# Patient Record
Sex: FEMALE | Race: BLACK OR AFRICAN AMERICAN | Employment: UNEMPLOYED | URBAN - METROPOLITAN AREA
[De-identification: names, ages, dates, MRNs, and addresses within clinical notes are randomized per-mention and may not be internally consistent; named-entity substitution may affect disease eponyms.]

---

## 2019-04-16 ENCOUNTER — HOSPITAL ENCOUNTER (OUTPATIENT)
Age: 19
Discharge: HOME | End: 2019-04-18
Payer: MEDICAID

## 2019-04-16 LAB — KLEIHAUER BETKE: NORMAL

## 2019-04-16 PROCEDURE — 85460 HEMOGLOBIN FETAL: CPT

## 2019-06-19 ENCOUNTER — HOSPITAL ENCOUNTER (OUTPATIENT)
Age: 19
Discharge: HOME | End: 2019-06-19
Payer: MEDICAID

## 2019-06-19 LAB
ABO/RH: NORMAL
ANTIBODY SCREEN: NORMAL
RHIG LOT NUMBER: NORMAL

## 2019-06-19 PROCEDURE — 86850 RBC ANTIBODY SCREEN: CPT

## 2019-06-19 PROCEDURE — 86901 BLOOD TYPING SEROLOGIC RH(D): CPT

## 2019-06-19 PROCEDURE — 6360000002 HC RX W HCPCS: Performed by: LEGAL MEDICINE

## 2019-06-19 PROCEDURE — 36415 COLL VENOUS BLD VENIPUNCTURE: CPT

## 2019-06-19 PROCEDURE — 86900 BLOOD TYPING SEROLOGIC ABO: CPT

## 2019-06-19 PROCEDURE — 96372 THER/PROPH/DIAG INJ SC/IM: CPT

## 2019-06-19 RX ADMIN — HUMAN RHO(D) IMMUNE GLOBULIN 300 MCG: 300 INJECTION, SOLUTION INTRAMUSCULAR at 20:15

## 2019-07-06 ENCOUNTER — HOSPITAL ENCOUNTER (EMERGENCY)
Age: 19
Discharge: HOME | End: 2019-07-06
Attending: EMERGENCY MEDICINE
Payer: MEDICAID

## 2019-07-06 VITALS
OXYGEN SATURATION: 98 % | HEART RATE: 100 BPM | DIASTOLIC BLOOD PRESSURE: 77 MMHG | RESPIRATION RATE: 16 BRPM | TEMPERATURE: 98.7 F | WEIGHT: 175 LBS | BODY MASS INDEX: 27.41 KG/M2 | SYSTOLIC BLOOD PRESSURE: 113 MMHG

## 2019-07-06 DIAGNOSIS — R31.9 URINARY TRACT INFECTION WITH HEMATURIA, SITE UNSPECIFIED: ICD-10-CM

## 2019-07-06 DIAGNOSIS — R31.9 HEMATURIA, UNSPECIFIED TYPE: Primary | ICD-10-CM

## 2019-07-06 DIAGNOSIS — N39.0 URINARY TRACT INFECTION WITH HEMATURIA, SITE UNSPECIFIED: ICD-10-CM

## 2019-07-06 LAB
BACTERIA: ABNORMAL /HPF
BILIRUBIN URINE: NEGATIVE
BLOOD, URINE: ABNORMAL
CLARITY: CLEAR
COLOR: YELLOW
EPITHELIAL CELLS, UA: ABNORMAL /HPF
GLUCOSE URINE: NEGATIVE MG/DL
KETONES, URINE: NEGATIVE MG/DL
LEUKOCYTE ESTERASE, URINE: ABNORMAL
NITRITE, URINE: NEGATIVE
PH UA: 7.5 (ref 5–9)
PROTEIN UA: NEGATIVE MG/DL
RBC UA: ABNORMAL /HPF (ref 0–2)
SPECIFIC GRAVITY UA: 1.01 (ref 1–1.03)
UROBILINOGEN, URINE: 1 E.U./DL
WBC UA: ABNORMAL /HPF (ref 0–5)

## 2019-07-06 PROCEDURE — 99283 EMERGENCY DEPT VISIT LOW MDM: CPT

## 2019-07-06 PROCEDURE — 81001 URINALYSIS AUTO W/SCOPE: CPT

## 2019-07-06 RX ORDER — FERROUS SULFATE 325(65) MG
325 TABLET ORAL
COMMUNITY
End: 2019-10-29

## 2019-07-06 RX ORDER — CEFDINIR 300 MG/1
300 CAPSULE ORAL 2 TIMES DAILY
Qty: 14 CAPSULE | Refills: 0 | Status: SHIPPED | OUTPATIENT
Start: 2019-07-06 | End: 2019-07-13

## 2019-07-06 SDOH — HEALTH STABILITY: MENTAL HEALTH: HOW OFTEN DO YOU HAVE A DRINK CONTAINING ALCOHOL?: NEVER

## 2019-07-06 ASSESSMENT — ENCOUNTER SYMPTOMS
NAUSEA: 0
BACK PAIN: 0
EYE DISCHARGE: 0
WHEEZING: 0
SHORTNESS OF BREATH: 0
EYE PAIN: 0
ABDOMINAL DISTENTION: 0
SORE THROAT: 0
EYE REDNESS: 0
VOMITING: 0
COUGH: 0
DIARRHEA: 0
SINUS PRESSURE: 0

## 2019-07-06 NOTE — ED PROVIDER NOTES
---------------------------------  At this time the patient is without objective evidence of an acute process requiring hospitalization or inpatient management. They have remained hemodynamically stable throughout their entire ED visit and are stable for discharge with outpatient follow-up. The plan has been discussed in detail and they are aware of the specific conditions for emergent return, as well as the importance of follow-up. New Prescriptions    CEFDINIR (OMNICEF) 300 MG CAPSULE    Take 1 capsule by mouth 2 times daily for 7 days       Diagnosis:  1. Hematuria, unspecified type    2. Urinary tract infection with hematuria, site unspecified        Disposition:  Patient's disposition: Discharge to home  Patient's condition is stable.          Chris Diaz DO  Resident  07/06/19 [id number]

## 2019-07-15 ENCOUNTER — HOSPITAL ENCOUNTER (OUTPATIENT)
Age: 19
Discharge: HOME | End: 2019-07-15
Attending: LEGAL MEDICINE | Admitting: LEGAL MEDICINE
Payer: MEDICAID

## 2019-07-15 ENCOUNTER — APPOINTMENT (OUTPATIENT)
Dept: ULTRASOUND IMAGING | Age: 19
End: 2019-07-15
Payer: MEDICAID

## 2019-07-15 VITALS — RESPIRATION RATE: 16 BRPM | DIASTOLIC BLOOD PRESSURE: 68 MMHG | SYSTOLIC BLOOD PRESSURE: 122 MMHG | HEART RATE: 88 BPM

## 2019-07-15 PROBLEM — Z34.93 NORMAL PREGNANCY IN THIRD TRIMESTER: Status: ACTIVE | Noted: 2019-07-15

## 2019-07-15 LAB
AMORPHOUS: NORMAL
AMPHETAMINE SCREEN, URINE: NOT DETECTED
BACTERIA: NORMAL /HPF
BARBITURATE SCREEN URINE: NOT DETECTED
BENZODIAZEPINE SCREEN, URINE: NOT DETECTED
BILIRUBIN URINE: NEGATIVE
BLOOD, URINE: NEGATIVE
CANNABINOID SCREEN URINE: NOT DETECTED
CASTS 2: NORMAL /LPF
CLARITY: CLEAR
COCAINE METABOLITE SCREEN URINE: NOT DETECTED
COLOR: YELLOW
EPITHELIAL CELLS, UA: NORMAL /HPF
FETAL FIBRONECTIN: POSITIVE
GLUCOSE URINE: NEGATIVE MG/DL
KETONES, URINE: NEGATIVE MG/DL
KLEIHAUER BETKE: NORMAL
LEUKOCYTE ESTERASE, URINE: ABNORMAL
METHADONE SCREEN, URINE: NOT DETECTED
NITRITE, URINE: NEGATIVE
OPIATE SCREEN URINE: NOT DETECTED
PH UA: 7.5 (ref 5–9)
PHENCYCLIDINE SCREEN URINE: NOT DETECTED
PROPOXYPHENE SCREEN: NOT DETECTED
PROTEIN UA: 30 MG/DL
RBC UA: NORMAL /HPF (ref 0–2)
SPECIFIC GRAVITY UA: 1.01 (ref 1–1.03)
UROBILINOGEN, URINE: 0.2 E.U./DL
WBC UA: NORMAL /HPF (ref 0–5)

## 2019-07-15 PROCEDURE — 36415 COLL VENOUS BLD VENIPUNCTURE: CPT

## 2019-07-15 PROCEDURE — 85460 HEMOGLOBIN FETAL: CPT

## 2019-07-15 PROCEDURE — 99212 OFFICE O/P EST SF 10 MIN: CPT

## 2019-07-15 PROCEDURE — 82731 ASSAY OF FETAL FIBRONECTIN: CPT

## 2019-07-15 PROCEDURE — 80307 DRUG TEST PRSMV CHEM ANLYZR: CPT

## 2019-07-15 PROCEDURE — 76819 FETAL BIOPHYS PROFIL W/O NST: CPT

## 2019-07-15 PROCEDURE — 81001 URINALYSIS AUTO W/SCOPE: CPT

## 2019-07-15 NOTE — PROGRESS NOTES
Patient denied any intercourse in past 24h when FFN was collected. Now states she had intercourse yesterday afternoon.

## 2019-07-15 NOTE — PROGRESS NOTES
Patient presents to unit stating she fell at home in her kitchen after slipping in water, fell onto her right side. Did have co abd tenderness at the time of the fall. Significant other was present . Denies pain at this time, no vaginal bleeding or leaking of fluid. Perceives fetal movement.

## 2019-07-16 NOTE — PROGRESS NOTES
Unable to get a hold of mother, patient spoke with Dr. Elena Watt and discharge instructions received.

## 2019-08-02 ENCOUNTER — HOSPITAL ENCOUNTER (OUTPATIENT)
Age: 19
Setting detail: OBSERVATION
Discharge: HOME | DRG: 560 | End: 2019-08-03
Attending: LEGAL MEDICINE | Admitting: LEGAL MEDICINE
Payer: MEDICAID

## 2019-08-02 PROBLEM — O26.90 COMPLICATED PREGNANCY, UNSPECIFIED TRIMESTER: Status: ACTIVE | Noted: 2019-08-02

## 2019-08-02 LAB
ABO/RH: NORMAL
ALBUMIN SERPL-MCNC: 3.7 G/DL (ref 3.5–5.2)
ALP BLD-CCNC: 221 U/L (ref 35–104)
ALT SERPL-CCNC: 10 U/L (ref 0–32)
AMPHETAMINE SCREEN, URINE: NOT DETECTED
ANION GAP SERPL CALCULATED.3IONS-SCNC: 13 MMOL/L (ref 7–16)
ANTIBODY IDENTIFICATION: NORMAL
ANTIBODY SCREEN: NORMAL
AST SERPL-CCNC: 27 U/L (ref 0–31)
BACTERIA: ABNORMAL /HPF
BARBITURATE SCREEN URINE: NOT DETECTED
BENZODIAZEPINE SCREEN, URINE: NOT DETECTED
BILIRUB SERPL-MCNC: 0.4 MG/DL (ref 0–1.2)
BILIRUBIN URINE: NEGATIVE
BLOOD, URINE: NEGATIVE
BUN BLDV-MCNC: 6 MG/DL (ref 6–20)
CALCIUM SERPL-MCNC: 9.1 MG/DL (ref 8.6–10.2)
CANNABINOID SCREEN URINE: NOT DETECTED
CHLORIDE BLD-SCNC: 101 MMOL/L (ref 98–107)
CLARITY: CLEAR
CO2: 19 MMOL/L (ref 22–29)
COCAINE METABOLITE SCREEN URINE: NOT DETECTED
COLOR: YELLOW
CREAT SERPL-MCNC: 0.6 MG/DL (ref 0.5–1)
DAT POLYSPECIFIC: NORMAL
EPITHELIAL CELLS, UA: ABNORMAL /HPF
GFR AFRICAN AMERICAN: >60
GFR NON-AFRICAN AMERICAN: >60 ML/MIN/1.73
GLUCOSE BLD-MCNC: 86 MG/DL (ref 74–99)
GLUCOSE URINE: NEGATIVE MG/DL
HCT VFR BLD CALC: 34.5 % (ref 34–48)
HEMOGLOBIN: 10.7 G/DL (ref 11.5–15.5)
KETONES, URINE: ABNORMAL MG/DL
LEUKOCYTE ESTERASE, URINE: ABNORMAL
MCH RBC QN AUTO: 24.2 PG (ref 26–35)
MCHC RBC AUTO-ENTMCNC: 31 % (ref 32–34.5)
MCV RBC AUTO: 77.9 FL (ref 80–99.9)
METHADONE SCREEN, URINE: NOT DETECTED
NITRITE, URINE: NEGATIVE
OPIATE SCREEN URINE: NOT DETECTED
PDW BLD-RTO: 14 FL (ref 11.5–15)
PH UA: 7 (ref 5–9)
PHENCYCLIDINE SCREEN URINE: NOT DETECTED
PLATELET # BLD: 174 E9/L (ref 130–450)
PMV BLD AUTO: 12.3 FL (ref 7–12)
POTASSIUM SERPL-SCNC: 4.9 MMOL/L (ref 3.5–5)
PROPOXYPHENE SCREEN: NOT DETECTED
PROTEIN UA: NEGATIVE MG/DL
RBC # BLD: 4.43 E12/L (ref 3.5–5.5)
RBC UA: ABNORMAL /HPF (ref 0–2)
SODIUM BLD-SCNC: 133 MMOL/L (ref 132–146)
SPECIFIC GRAVITY UA: 1.01 (ref 1–1.03)
TOTAL PROTEIN: 7.5 G/DL (ref 6.4–8.3)
UROBILINOGEN, URINE: 1 E.U./DL
WBC # BLD: 11.4 E9/L (ref 4.5–11.5)
WBC UA: ABNORMAL /HPF (ref 0–5)

## 2019-08-02 PROCEDURE — 81001 URINALYSIS AUTO W/SCOPE: CPT

## 2019-08-02 PROCEDURE — 96375 TX/PRO/DX INJ NEW DRUG ADDON: CPT

## 2019-08-02 PROCEDURE — 86900 BLOOD TYPING SEROLOGIC ABO: CPT

## 2019-08-02 PROCEDURE — 96360 HYDRATION IV INFUSION INIT: CPT

## 2019-08-02 PROCEDURE — 99211 OFF/OP EST MAY X REQ PHY/QHP: CPT

## 2019-08-02 PROCEDURE — 36415 COLL VENOUS BLD VENIPUNCTURE: CPT

## 2019-08-02 PROCEDURE — 6360000002 HC RX W HCPCS

## 2019-08-02 PROCEDURE — 80307 DRUG TEST PRSMV CHEM ANLYZR: CPT

## 2019-08-02 PROCEDURE — 80053 COMPREHEN METABOLIC PANEL: CPT

## 2019-08-02 PROCEDURE — 96365 THER/PROPH/DIAG IV INF INIT: CPT

## 2019-08-02 PROCEDURE — 6370000000 HC RX 637 (ALT 250 FOR IP): Performed by: LEGAL MEDICINE

## 2019-08-02 PROCEDURE — 96361 HYDRATE IV INFUSION ADD-ON: CPT

## 2019-08-02 PROCEDURE — 6360000002 HC RX W HCPCS: Performed by: LEGAL MEDICINE

## 2019-08-02 PROCEDURE — 96372 THER/PROPH/DIAG INJ SC/IM: CPT

## 2019-08-02 PROCEDURE — 86880 COOMBS TEST DIRECT: CPT

## 2019-08-02 PROCEDURE — 86901 BLOOD TYPING SEROLOGIC RH(D): CPT

## 2019-08-02 PROCEDURE — 86870 RBC ANTIBODY IDENTIFICATION: CPT

## 2019-08-02 PROCEDURE — 2580000003 HC RX 258: Performed by: LEGAL MEDICINE

## 2019-08-02 PROCEDURE — 86850 RBC ANTIBODY SCREEN: CPT

## 2019-08-02 PROCEDURE — 85027 COMPLETE CBC AUTOMATED: CPT

## 2019-08-02 PROCEDURE — G0378 HOSPITAL OBSERVATION PER HR: HCPCS

## 2019-08-02 RX ORDER — ONDANSETRON 2 MG/ML
4 INJECTION INTRAMUSCULAR; INTRAVENOUS EVERY 6 HOURS PRN
Status: DISCONTINUED | OUTPATIENT
Start: 2019-08-02 | End: 2019-08-03 | Stop reason: HOSPADM

## 2019-08-02 RX ORDER — SODIUM CHLORIDE, SODIUM LACTATE, POTASSIUM CHLORIDE, CALCIUM CHLORIDE 600; 310; 30; 20 MG/100ML; MG/100ML; MG/100ML; MG/100ML
INJECTION, SOLUTION INTRAVENOUS CONTINUOUS
Status: DISCONTINUED | OUTPATIENT
Start: 2019-08-02 | End: 2019-08-03 | Stop reason: HOSPADM

## 2019-08-02 RX ORDER — HYDROMORPHONE HCL 110MG/55ML
0.5 PATIENT CONTROLLED ANALGESIA SYRINGE INTRAVENOUS ONCE
Status: COMPLETED | OUTPATIENT
Start: 2019-08-02 | End: 2019-08-02

## 2019-08-02 RX ORDER — ONDANSETRON 2 MG/ML
INJECTION INTRAMUSCULAR; INTRAVENOUS
Status: COMPLETED
Start: 2019-08-02 | End: 2019-08-02

## 2019-08-02 RX ORDER — HYDROCODONE BITARTRATE AND ACETAMINOPHEN 5; 325 MG/1; MG/1
1 TABLET ORAL EVERY 6 HOURS PRN
Status: DISCONTINUED | OUTPATIENT
Start: 2019-08-02 | End: 2019-08-03 | Stop reason: HOSPADM

## 2019-08-02 RX ADMIN — HYDROCODONE BITARTRATE AND ACETAMINOPHEN 1 TABLET: 5; 325 TABLET ORAL at 21:56

## 2019-08-02 RX ADMIN — SODIUM CHLORIDE, POTASSIUM CHLORIDE, SODIUM LACTATE AND CALCIUM CHLORIDE: 600; 310; 30; 20 INJECTION, SOLUTION INTRAVENOUS at 13:20

## 2019-08-02 RX ADMIN — ONDANSETRON 4 MG: 2 INJECTION INTRAMUSCULAR; INTRAVENOUS at 13:26

## 2019-08-02 RX ADMIN — SODIUM CHLORIDE, POTASSIUM CHLORIDE, SODIUM LACTATE AND CALCIUM CHLORIDE: 600; 310; 30; 20 INJECTION, SOLUTION INTRAVENOUS at 17:41

## 2019-08-02 RX ADMIN — HYDROMORPHONE HYDROCHLORIDE 0.5 MG: 2 INJECTION, SOLUTION INTRAMUSCULAR; INTRAVENOUS; SUBCUTANEOUS at 14:01

## 2019-08-02 ASSESSMENT — PAIN DESCRIPTION - DESCRIPTORS
DESCRIPTORS: CRAMPING

## 2019-08-02 ASSESSMENT — PAIN SCALES - GENERAL
PAINLEVEL_OUTOF10: 0
PAINLEVEL_OUTOF10: 10
PAINLEVEL_OUTOF10: 10

## 2019-08-03 ENCOUNTER — APPOINTMENT (OUTPATIENT)
Dept: ULTRASOUND IMAGING | Age: 19
DRG: 560 | End: 2019-08-03
Payer: MEDICAID

## 2019-08-03 VITALS
WEIGHT: 180 LBS | TEMPERATURE: 98.7 F | HEIGHT: 66 IN | BODY MASS INDEX: 28.93 KG/M2 | HEART RATE: 88 BPM | DIASTOLIC BLOOD PRESSURE: 66 MMHG | SYSTOLIC BLOOD PRESSURE: 124 MMHG | RESPIRATION RATE: 16 BRPM

## 2019-08-03 PROCEDURE — 76819 FETAL BIOPHYS PROFIL W/O NST: CPT

## 2019-08-03 PROCEDURE — G0378 HOSPITAL OBSERVATION PER HR: HCPCS

## 2019-08-03 NOTE — PROGRESS NOTES
RN assuming care of patient. Irregular mild/moderate contractions continue -patient tolerating well thus far. Reactive FHR tracing observed. VSS. Awaiting BPP in ultrasound. Radha Mora in to evaluate patient-vaginal exam done and cervix  4cm/thick per  and states  discharge pending until  BPP and JED resulted.  request to notify her with results prior to discharge. Call bell in reach.

## 2019-08-03 NOTE — PROGRESS NOTES
Verbal discharge instructions reviewed with patient with copy given-verbalized understanding. Instructed to return for increased pain,signs of labor, leaking fluid,vaginal bleeding or decrease in fetal activity-patient verbalized understanding. To return on 8-7-19 for scheduled office visit with Crow Rico. Discharged to home undelivered in stable condition accompanied by support person from L&D to hospital exit.

## 2019-08-03 NOTE — PROGRESS NOTES
Updated Dr. Cassy Del Valle on patient. BPP with JED to be done this morning. No new orders received.

## 2019-08-03 NOTE — H&P
pregnancy at 36.5 weeks with nausea, vomiting,  abdominal pain. Group B Strep positive. PLAN:  We will go ahead and initiate IV fluids. Antiemetics will be  given. Pain medications will be given. Further management based on  clinical response.         Sofia Hidalgo MD    D: 08/03/2019 7:57:37       T: 08/03/2019 8:27:29     [email or web address]  Job#: [id number]     Doc#: [id number]    CC:

## 2019-08-04 ENCOUNTER — HOSPITAL ENCOUNTER (INPATIENT)
Age: 19
LOS: 3 days | Discharge: HOME | DRG: 560 | End: 2019-08-07
Attending: LEGAL MEDICINE | Admitting: LEGAL MEDICINE
Payer: MEDICAID

## 2019-08-04 LAB
ABO/RH: NORMAL
ALBUMIN SERPL-MCNC: 3.3 G/DL (ref 3.5–5.2)
ALP BLD-CCNC: 209 U/L (ref 35–104)
ALT SERPL-CCNC: 7 U/L (ref 0–32)
AMPHETAMINE SCREEN, URINE: NOT DETECTED
ANION GAP SERPL CALCULATED.3IONS-SCNC: 12 MMOL/L (ref 7–16)
ANTIBODY IDENTIFICATION: NORMAL
ANTIBODY SCREEN: NORMAL
AST SERPL-CCNC: 16 U/L (ref 0–31)
BACTERIA: ABNORMAL /HPF
BARBITURATE SCREEN URINE: NOT DETECTED
BENZODIAZEPINE SCREEN, URINE: NOT DETECTED
BILIRUB SERPL-MCNC: 0.3 MG/DL (ref 0–1.2)
BILIRUBIN URINE: NEGATIVE
BLOOD, URINE: ABNORMAL
BUN BLDV-MCNC: 5 MG/DL (ref 6–20)
CALCIUM SERPL-MCNC: 9.3 MG/DL (ref 8.6–10.2)
CANNABINOID SCREEN URINE: NOT DETECTED
CHLORIDE BLD-SCNC: 104 MMOL/L (ref 98–107)
CLARITY: ABNORMAL
CO2: 20 MMOL/L (ref 22–29)
COCAINE METABOLITE SCREEN URINE: NOT DETECTED
COLOR: YELLOW
CREAT SERPL-MCNC: 0.6 MG/DL (ref 0.5–1)
DAT POLYSPECIFIC: NORMAL
DR. NOTIFY: NORMAL
EPITHELIAL CELLS, UA: ABNORMAL /HPF
GFR AFRICAN AMERICAN: >60
GFR NON-AFRICAN AMERICAN: >60 ML/MIN/1.73
GLUCOSE BLD-MCNC: 76 MG/DL (ref 74–99)
GLUCOSE URINE: NEGATIVE MG/DL
HCT VFR BLD CALC: 32.6 % (ref 34–48)
HEMOGLOBIN: 9.9 G/DL (ref 11.5–15.5)
KETONES, URINE: NEGATIVE MG/DL
LEUKOCYTE ESTERASE, URINE: ABNORMAL
MCH RBC QN AUTO: 24 PG (ref 26–35)
MCHC RBC AUTO-ENTMCNC: 30.4 % (ref 32–34.5)
MCV RBC AUTO: 78.9 FL (ref 80–99.9)
METHADONE SCREEN, URINE: NOT DETECTED
NITRITE, URINE: NEGATIVE
OPIATE SCREEN URINE: NOT DETECTED
PDW BLD-RTO: 14.3 FL (ref 11.5–15)
PH UA: 6.5 (ref 5–9)
PHENCYCLIDINE SCREEN URINE: NOT DETECTED
PLATELET # BLD: 174 E9/L (ref 130–450)
PMV BLD AUTO: 12.2 FL (ref 7–12)
POTASSIUM SERPL-SCNC: 4.2 MMOL/L (ref 3.5–5)
PROPOXYPHENE SCREEN: NOT DETECTED
PROTEIN UA: 30 MG/DL
RBC # BLD: 4.13 E12/L (ref 3.5–5.5)
RBC UA: ABNORMAL /HPF (ref 0–2)
SODIUM BLD-SCNC: 136 MMOL/L (ref 132–146)
SPECIFIC GRAVITY UA: 1.01 (ref 1–1.03)
TOTAL PROTEIN: 7 G/DL (ref 6.4–8.3)
UROBILINOGEN, URINE: 2 E.U./DL
WBC # BLD: 11.9 E9/L (ref 4.5–11.5)
WBC UA: ABNORMAL /HPF (ref 0–5)

## 2019-08-04 PROCEDURE — 86901 BLOOD TYPING SEROLOGIC RH(D): CPT

## 2019-08-04 PROCEDURE — 6360000002 HC RX W HCPCS

## 2019-08-04 PROCEDURE — 2580000003 HC RX 258: Performed by: LEGAL MEDICINE

## 2019-08-04 PROCEDURE — 86850 RBC ANTIBODY SCREEN: CPT

## 2019-08-04 PROCEDURE — 80307 DRUG TEST PRSMV CHEM ANLYZR: CPT

## 2019-08-04 PROCEDURE — 85027 COMPLETE CBC AUTOMATED: CPT

## 2019-08-04 PROCEDURE — 86870 RBC ANTIBODY IDENTIFICATION: CPT

## 2019-08-04 PROCEDURE — 1220000001 HC SEMI PRIVATE L&D R&B

## 2019-08-04 PROCEDURE — 36415 COLL VENOUS BLD VENIPUNCTURE: CPT

## 2019-08-04 PROCEDURE — 80053 COMPREHEN METABOLIC PANEL: CPT

## 2019-08-04 PROCEDURE — 86900 BLOOD TYPING SEROLOGIC ABO: CPT

## 2019-08-04 PROCEDURE — 6360000002 HC RX W HCPCS: Performed by: LEGAL MEDICINE

## 2019-08-04 PROCEDURE — 86880 COOMBS TEST DIRECT: CPT

## 2019-08-04 PROCEDURE — 81001 URINALYSIS AUTO W/SCOPE: CPT

## 2019-08-04 RX ORDER — PENICILLIN G 3000000 [IU]/50ML
3 INJECTION, SOLUTION INTRAVENOUS EVERY 4 HOURS
Status: DISCONTINUED | OUTPATIENT
Start: 2019-08-04 | End: 2019-08-05

## 2019-08-04 RX ORDER — ONDANSETRON 2 MG/ML
4 INJECTION INTRAMUSCULAR; INTRAVENOUS EVERY 6 HOURS PRN
Status: DISCONTINUED | OUTPATIENT
Start: 2019-08-04 | End: 2019-08-05 | Stop reason: SDUPTHER

## 2019-08-04 RX ORDER — LIDOCAINE HYDROCHLORIDE 10 MG/ML
30 INJECTION, SOLUTION EPIDURAL; INFILTRATION; INTRACAUDAL; PERINEURAL PRN
Status: DISCONTINUED | OUTPATIENT
Start: 2019-08-04 | End: 2019-08-05

## 2019-08-04 RX ORDER — SODIUM CHLORIDE, SODIUM LACTATE, POTASSIUM CHLORIDE, CALCIUM CHLORIDE 600; 310; 30; 20 MG/100ML; MG/100ML; MG/100ML; MG/100ML
INJECTION, SOLUTION INTRAVENOUS CONTINUOUS
Status: DISCONTINUED | OUTPATIENT
Start: 2019-08-04 | End: 2019-08-05

## 2019-08-04 RX ORDER — SODIUM CHLORIDE 0.9 % (FLUSH) 0.9 %
10 SYRINGE (ML) INJECTION PRN
Status: DISCONTINUED | OUTPATIENT
Start: 2019-08-04 | End: 2019-08-05

## 2019-08-04 RX ORDER — PENICILLIN G POTASSIUM 5000000 [IU]/1
INJECTION, POWDER, FOR SOLUTION INTRAMUSCULAR; INTRAVENOUS
Status: COMPLETED
Start: 2019-08-04 | End: 2019-08-04

## 2019-08-04 RX ORDER — SODIUM CHLORIDE 0.9 % (FLUSH) 0.9 %
10 SYRINGE (ML) INJECTION EVERY 12 HOURS SCHEDULED
Status: DISCONTINUED | OUTPATIENT
Start: 2019-08-04 | End: 2019-08-05

## 2019-08-04 RX ADMIN — DEXTROSE MONOHYDRATE 5 MILLION UNITS: 5 INJECTION INTRAVENOUS at 16:50

## 2019-08-04 RX ADMIN — SODIUM CHLORIDE, POTASSIUM CHLORIDE, SODIUM LACTATE AND CALCIUM CHLORIDE: 600; 310; 30; 20 INJECTION, SOLUTION INTRAVENOUS at 22:46

## 2019-08-04 RX ADMIN — Medication 1 MILLI-UNITS/MIN: at 22:45

## 2019-08-04 RX ADMIN — PENICILLIN G POTASSIUM 5 MILLION UNITS: 5000000 POWDER, FOR SOLUTION INTRAMUSCULAR; INTRAPLEURAL; INTRATHECAL; INTRAVENOUS at 16:50

## 2019-08-04 RX ADMIN — PENICILLIN G 3 MILLION UNITS: 3000000 INJECTION, SOLUTION INTRAVENOUS at 20:53

## 2019-08-04 RX ADMIN — SODIUM CHLORIDE, POTASSIUM CHLORIDE, SODIUM LACTATE AND CALCIUM CHLORIDE: 600; 310; 30; 20 INJECTION, SOLUTION INTRAVENOUS at 16:45

## 2019-08-04 ASSESSMENT — PAIN - FUNCTIONAL ASSESSMENT: PAIN_FUNCTIONAL_ASSESSMENT: 0-10

## 2019-08-04 NOTE — PROGRESS NOTES
Dr. Raghav Cadena updated on repeat SVE. Aware that patient is now deciding against an epidural. Notified of fetal heart rate and uterine activity. Per Dr. Raghav Cadena, update once again when patient is 8cm.

## 2019-08-05 PROCEDURE — 6360000002 HC RX W HCPCS

## 2019-08-05 PROCEDURE — 1220000001 HC SEMI PRIVATE L&D R&B

## 2019-08-05 PROCEDURE — 2580000003 HC RX 258: Performed by: LEGAL MEDICINE

## 2019-08-05 PROCEDURE — 7200000001 HC VAGINAL DELIVERY

## 2019-08-05 PROCEDURE — 0KQM0ZZ REPAIR PERINEUM MUSCLE, OPEN APPROACH: ICD-10-PCS | Performed by: LEGAL MEDICINE

## 2019-08-05 PROCEDURE — 6370000000 HC RX 637 (ALT 250 FOR IP): Performed by: LEGAL MEDICINE

## 2019-08-05 PROCEDURE — 88307 TISSUE EXAM BY PATHOLOGIST: CPT

## 2019-08-05 PROCEDURE — 6360000002 HC RX W HCPCS: Performed by: LEGAL MEDICINE

## 2019-08-05 RX ORDER — LANOLIN 100 %
OINTMENT (GRAM) TOPICAL PRN
Status: DISCONTINUED | OUTPATIENT
Start: 2019-08-05 | End: 2019-08-07 | Stop reason: HOSPADM

## 2019-08-05 RX ORDER — SODIUM CHLORIDE 0.9 % (FLUSH) 0.9 %
10 SYRINGE (ML) INJECTION PRN
Status: DISCONTINUED | OUTPATIENT
Start: 2019-08-05 | End: 2019-08-07 | Stop reason: HOSPADM

## 2019-08-05 RX ORDER — MORPHINE SULFATE 2 MG/ML
2 INJECTION, SOLUTION INTRAMUSCULAR; INTRAVENOUS EVERY 4 HOURS PRN
Status: DISCONTINUED | OUTPATIENT
Start: 2019-08-05 | End: 2019-08-05

## 2019-08-05 RX ORDER — NALBUPHINE HCL 10 MG/ML
5 AMPUL (ML) INJECTION EVERY 4 HOURS PRN
Status: DISCONTINUED | OUTPATIENT
Start: 2019-08-05 | End: 2019-08-05

## 2019-08-05 RX ORDER — CARBOPROST TROMETHAMINE 250 UG/ML
250 INJECTION, SOLUTION INTRAMUSCULAR
Status: ACTIVE | OUTPATIENT
Start: 2019-08-05 | End: 2019-08-05

## 2019-08-05 RX ORDER — ONDANSETRON 2 MG/ML
4 INJECTION INTRAMUSCULAR; INTRAVENOUS EVERY 6 HOURS PRN
Status: DISCONTINUED | OUTPATIENT
Start: 2019-08-05 | End: 2019-08-05

## 2019-08-05 RX ORDER — MORPHINE SULFATE 2 MG/ML
INJECTION, SOLUTION INTRAMUSCULAR; INTRAVENOUS
Status: COMPLETED
Start: 2019-08-05 | End: 2019-08-05

## 2019-08-05 RX ORDER — NALOXONE HYDROCHLORIDE 0.4 MG/ML
0.4 INJECTION, SOLUTION INTRAMUSCULAR; INTRAVENOUS; SUBCUTANEOUS PRN
Status: DISCONTINUED | OUTPATIENT
Start: 2019-08-05 | End: 2019-08-05

## 2019-08-05 RX ORDER — IBUPROFEN 400 MG/1
400 TABLET ORAL EVERY 6 HOURS PRN
Status: DISCONTINUED | OUTPATIENT
Start: 2019-08-05 | End: 2019-08-07 | Stop reason: HOSPADM

## 2019-08-05 RX ORDER — SODIUM CHLORIDE, SODIUM LACTATE, POTASSIUM CHLORIDE, CALCIUM CHLORIDE 600; 310; 30; 20 MG/100ML; MG/100ML; MG/100ML; MG/100ML
INJECTION, SOLUTION INTRAVENOUS CONTINUOUS
Status: DISCONTINUED | OUTPATIENT
Start: 2019-08-05 | End: 2019-08-07 | Stop reason: HOSPADM

## 2019-08-05 RX ORDER — OXYCODONE HYDROCHLORIDE 5 MG/1
5 TABLET ORAL EVERY 4 HOURS PRN
Status: DISCONTINUED | OUTPATIENT
Start: 2019-08-05 | End: 2019-08-07 | Stop reason: HOSPADM

## 2019-08-05 RX ORDER — DOCUSATE SODIUM 100 MG/1
100 CAPSULE, LIQUID FILLED ORAL 2 TIMES DAILY
Status: DISCONTINUED | OUTPATIENT
Start: 2019-08-05 | End: 2019-08-07 | Stop reason: HOSPADM

## 2019-08-05 RX ORDER — SODIUM CHLORIDE 0.9 % (FLUSH) 0.9 %
10 SYRINGE (ML) INJECTION EVERY 12 HOURS SCHEDULED
Status: DISCONTINUED | OUTPATIENT
Start: 2019-08-05 | End: 2019-08-07 | Stop reason: HOSPADM

## 2019-08-05 RX ORDER — ACETAMINOPHEN 500 MG
1000 TABLET ORAL EVERY 6 HOURS PRN
Status: DISCONTINUED | OUTPATIENT
Start: 2019-08-05 | End: 2019-08-07 | Stop reason: HOSPADM

## 2019-08-05 RX ORDER — METHYLERGONOVINE MALEATE 0.2 MG/ML
200 INJECTION INTRAVENOUS
Status: ACTIVE | OUTPATIENT
Start: 2019-08-05 | End: 2019-08-05

## 2019-08-05 RX ORDER — FERROUS SULFATE 325(65) MG
325 TABLET ORAL
Status: DISCONTINUED | OUTPATIENT
Start: 2019-08-05 | End: 2019-08-07 | Stop reason: HOSPADM

## 2019-08-05 RX ADMIN — Medication: at 03:33

## 2019-08-05 RX ADMIN — IBUPROFEN 400 MG: 400 TABLET ORAL at 14:48

## 2019-08-05 RX ADMIN — MORPHINE SULFATE 2 MG: 2 INJECTION, SOLUTION INTRAMUSCULAR; INTRAVENOUS at 01:55

## 2019-08-05 RX ADMIN — Medication 10 ML: at 08:15

## 2019-08-05 RX ADMIN — WITCH HAZEL 40 EACH: 500 SOLUTION RECTAL; TOPICAL at 03:33

## 2019-08-05 RX ADMIN — BENZOCAINE AND LEVOMENTHOL: 200; 5 SPRAY TOPICAL at 03:33

## 2019-08-05 RX ADMIN — DOCUSATE SODIUM 100 MG: 100 CAPSULE, LIQUID FILLED ORAL at 21:51

## 2019-08-05 RX ADMIN — ACETAMINOPHEN 1000 MG: 500 TABLET, FILM COATED ORAL at 08:14

## 2019-08-05 RX ADMIN — DOCUSATE SODIUM 100 MG: 100 CAPSULE, LIQUID FILLED ORAL at 08:14

## 2019-08-05 RX ADMIN — IBUPROFEN 400 MG: 400 TABLET ORAL at 03:33

## 2019-08-05 RX ADMIN — FERROUS SULFATE TAB 325 MG (65 MG ELEMENTAL FE) 325 MG: 325 (65 FE) TAB at 11:08

## 2019-08-05 RX ADMIN — SODIUM CHLORIDE, POTASSIUM CHLORIDE, SODIUM LACTATE AND CALCIUM CHLORIDE: 600; 310; 30; 20 INJECTION, SOLUTION INTRAVENOUS at 03:00

## 2019-08-05 RX ADMIN — OXYCODONE HYDROCHLORIDE 5 MG: 5 TABLET ORAL at 10:21

## 2019-08-05 RX ADMIN — FERROUS SULFATE TAB 325 MG (65 MG ELEMENTAL FE) 325 MG: 325 (65 FE) TAB at 08:14

## 2019-08-05 RX ADMIN — PENICILLIN G 3 MILLION UNITS: 3000000 INJECTION, SOLUTION INTRAVENOUS at 00:57

## 2019-08-05 RX ADMIN — FERROUS SULFATE TAB 325 MG (65 MG ELEMENTAL FE) 325 MG: 325 (65 FE) TAB at 17:21

## 2019-08-05 ASSESSMENT — PAIN DESCRIPTION - DESCRIPTORS
DESCRIPTORS: CRAMPING

## 2019-08-05 ASSESSMENT — PAIN - FUNCTIONAL ASSESSMENT
PAIN_FUNCTIONAL_ASSESSMENT: 0-10

## 2019-08-05 ASSESSMENT — PAIN SCALES - GENERAL
PAINLEVEL_OUTOF10: 5
PAINLEVEL_OUTOF10: 8
PAINLEVEL_OUTOF10: 8
PAINLEVEL_OUTOF10: 10

## 2019-08-05 NOTE — H&P
[address] [place]                              HISTORY AND PHYSICAL    PATIENT NAME: Bonifacio Banks                    :        2000  MED REC NO:   [id number]                            ROOM:       LD10  ACCOUNT NO:   [de-identified]                           ADMIT DATE: 2019. PROVIDER:     Christy Hughes MD    HISTORY OF PRESENT ILLNESS:   24-year-old black female,  1, para  0, EDC 2019, presented to Labor and Delivery on 2019 with  complaints of spontaneous rupture of membranes shortly prior to  presentation. Antepartum care was marked by short cervix. She had  received steroids on 2019 and 2019. Urine culture had been  positive for Group B Strep earlier in the pregnancy. She denied vaginal  bleeding or change in fetal movements. No change in her bowel or  urinary habits. No fevers or chills. PAST MEDICAL HISTORY:  Negative. PAST SURGICAL HISTORY:  Negative. PAST GYN HISTORY:  History of Chlamydia in the first trimester. No DVT,  PE, or other STDs. SOCIAL HISTORY:  Positive for tobacco one-pack per day less than one  year. No alcohol or drugs. Previous drug screens have manifested  marijuana. FAMILY HISTORY:  Noncontributory. ALLERGIES:  No known drug allergies. MEDICATIONS:  Prenatal vitamins. REVIEW OF SYSTEMS:  Negative for cardiac, respiratory, GI, or   abnormalities. PHYSICAL EXAMINATION:  VITAL SIGNS:  Stable. She is afebrile. ABDOMEN/PELVIC:  Fetal heart tones are present in the 140s with  daez-kf-wcmz variability present. Accelerations noted. Contractions  are occurring every 2 to 3 minutes. Cervix is 8, complete vertex and -1  station. Large amount of stool is present in the rectum. Estimated fetal weight 3200 grams. EXTREMITIES:  No clubbing, cyanosis. 1+ edema. NEURO:  CN II through XII are grossly intact.   She is alert and oriented  x4. ASSESSMENT:  Intrauterine pregnancy at 36.5 weeks in labor. Group B  Strep is positive. PLAN:  Risks of vaginal delivery and operative delivery have been  reviewed with her. Indications for operative delivery have been  reviewed with her. Shoulder dystocia and birth trauma have been  reviewed with her. All her questions have been answered and she wishes  to proceed with attempt at vaginal delivery.         Pranay Cisneros MD    D: 08/05/2019 2:34:51       T: 08/05/2019 3:19:41     [email or web address]  Job#: [id number]     Doc#: [id number]    CC:

## 2019-08-05 NOTE — PROGRESS NOTES
Assumed care of pt for 11-7 shift. First contact with pt. Plan of care for night discussed. Pt verbal;izes understanding of IV pitocin to be off at present time. Positive fetal movement perceived by pt and audible per RN. EFM monitored and assessed every 15 minutes.

## 2019-08-05 NOTE — PROGRESS NOTES
Recovery period started. Pt verbalizes understanding of need to remain in bed without RN assist. Bonding well with baby.

## 2019-08-05 NOTE — PROGRESS NOTES
Pt offered pain medication interventions and declined at this time. Educated pt on pain scaled. Verbalizes understanding rates pain8/10. resp easy. Vss. Relaxing on phone in bed.

## 2019-08-05 NOTE — PROGRESS NOTES
Dr. Nannette Torres bedside with VE completed. Instructed pt to receive epidural. Pt verbalizes consent. Anesthesia paged, IV bolus started. Pt instructed on importance of positioning during epidural placement.

## 2019-08-06 PROBLEM — Z37.9 NORMAL LABOR: Status: ACTIVE | Noted: 2019-08-06

## 2019-08-06 LAB
FETAL SCREEN: NORMAL
HEMOGLOBIN: 10.5 G/DL (ref 11.5–15.5)
RHIG LOT NUMBER: NORMAL

## 2019-08-06 PROCEDURE — 6360000002 HC RX W HCPCS: Performed by: LEGAL MEDICINE

## 2019-08-06 PROCEDURE — 85461 HEMOGLOBIN FETAL: CPT

## 2019-08-06 PROCEDURE — 1220000001 HC SEMI PRIVATE L&D R&B

## 2019-08-06 PROCEDURE — 85018 HEMOGLOBIN: CPT

## 2019-08-06 PROCEDURE — 6370000000 HC RX 637 (ALT 250 FOR IP): Performed by: LEGAL MEDICINE

## 2019-08-06 PROCEDURE — 36415 COLL VENOUS BLD VENIPUNCTURE: CPT

## 2019-08-06 PROCEDURE — 96372 THER/PROPH/DIAG INJ SC/IM: CPT

## 2019-08-06 RX ADMIN — DOCUSATE SODIUM 100 MG: 100 CAPSULE, LIQUID FILLED ORAL at 09:43

## 2019-08-06 RX ADMIN — IBUPROFEN 400 MG: 400 TABLET ORAL at 09:50

## 2019-08-06 RX ADMIN — IBUPROFEN 400 MG: 400 TABLET ORAL at 20:44

## 2019-08-06 RX ADMIN — HUMAN RHO(D) IMMUNE GLOBULIN 300 MCG: 300 INJECTION, SOLUTION INTRAMUSCULAR at 10:11

## 2019-08-06 RX ADMIN — FERROUS SULFATE TAB 325 MG (65 MG ELEMENTAL FE) 325 MG: 325 (65 FE) TAB at 09:44

## 2019-08-06 RX ADMIN — DOCUSATE SODIUM 100 MG: 100 CAPSULE, LIQUID FILLED ORAL at 20:44

## 2019-08-06 ASSESSMENT — PAIN - FUNCTIONAL ASSESSMENT: PAIN_FUNCTIONAL_ASSESSMENT: 0-10

## 2019-08-06 ASSESSMENT — PAIN SCALES - GENERAL
PAINLEVEL_OUTOF10: 10
PAINLEVEL_OUTOF10: 0
PAINLEVEL_OUTOF10: 7

## 2019-08-07 VITALS
BODY MASS INDEX: 28.93 KG/M2 | WEIGHT: 180 LBS | HEIGHT: 66 IN | SYSTOLIC BLOOD PRESSURE: 105 MMHG | DIASTOLIC BLOOD PRESSURE: 59 MMHG | RESPIRATION RATE: 16 BRPM | OXYGEN SATURATION: 99 % | HEART RATE: 59 BPM | TEMPERATURE: 98.5 F

## 2019-08-07 PROCEDURE — 90715 TDAP VACCINE 7 YRS/> IM: CPT | Performed by: LEGAL MEDICINE

## 2019-08-07 PROCEDURE — 6360000002 HC RX W HCPCS: Performed by: LEGAL MEDICINE

## 2019-08-07 PROCEDURE — 90471 IMMUNIZATION ADMIN: CPT | Performed by: LEGAL MEDICINE

## 2019-08-07 RX ADMIN — TETANUS TOXOID, REDUCED DIPHTHERIA TOXOID AND ACELLULAR PERTUSSIS VACCINE, ADSORBED 0.5 ML: 5; 2.5; 8; 8; 2.5 SUSPENSION INTRAMUSCULAR at 09:52

## 2019-10-29 ENCOUNTER — HOSPITAL ENCOUNTER (EMERGENCY)
Age: 19
Discharge: HOME | End: 2019-10-29
Attending: EMERGENCY MEDICINE
Payer: MEDICAID

## 2019-10-29 VITALS
BODY MASS INDEX: 25.39 KG/M2 | RESPIRATION RATE: 16 BRPM | OXYGEN SATURATION: 98 % | DIASTOLIC BLOOD PRESSURE: 66 MMHG | HEART RATE: 60 BPM | WEIGHT: 158 LBS | TEMPERATURE: 98 F | HEIGHT: 66 IN | SYSTOLIC BLOOD PRESSURE: 118 MMHG

## 2019-10-29 DIAGNOSIS — H10.33 ACUTE BACTERIAL CONJUNCTIVITIS OF BOTH EYES: Primary | ICD-10-CM

## 2019-10-29 PROCEDURE — G0381 LEV 2 HOSP TYPE B ED VISIT: HCPCS

## 2019-10-29 RX ORDER — TOBRAMYCIN 3 MG/ML
1 SOLUTION/ DROPS OPHTHALMIC EVERY 6 HOURS
Qty: 1 BOTTLE | Refills: 0 | Status: SHIPPED | OUTPATIENT
Start: 2019-10-29 | End: 2019-11-08

## 2019-10-29 ASSESSMENT — ENCOUNTER SYMPTOMS
SINUS PRESSURE: 0
EYE DISCHARGE: 1
VOMITING: 0
EYE PAIN: 0
SHORTNESS OF BREATH: 0
NAUSEA: 0
EYE ITCHING: 1
DIARRHEA: 0
COUGH: 0
PERI-ORBITAL EDEMA: 1
BACK PAIN: 0
ABDOMINAL DISTENTION: 0
EYE REDNESS: 1
SORE THROAT: 0
WHEEZING: 0

## 2020-10-11 ENCOUNTER — HOSPITAL ENCOUNTER (INPATIENT)
Age: 20
LOS: 1 days | Discharge: HOME | DRG: 560 | End: 2020-10-12
Attending: LEGAL MEDICINE | Admitting: LEGAL MEDICINE
Payer: MEDICAID

## 2020-10-11 LAB
ABO/RH: NORMAL
ALBUMIN SERPL-MCNC: 3.4 G/DL (ref 3.5–5.2)
ALP BLD-CCNC: 172 U/L (ref 35–104)
ALT SERPL-CCNC: <5 U/L (ref 0–32)
AMPHETAMINE SCREEN, URINE: NOT DETECTED
ANION GAP SERPL CALCULATED.3IONS-SCNC: 10 MMOL/L (ref 7–16)
ANTIBODY SCREEN: NORMAL
AST SERPL-CCNC: 14 U/L (ref 0–31)
BARBITURATE SCREEN URINE: NOT DETECTED
BENZODIAZEPINE SCREEN, URINE: NOT DETECTED
BILIRUB SERPL-MCNC: 0.3 MG/DL (ref 0–1.2)
BILIRUBIN URINE: NEGATIVE
BLOOD, URINE: NEGATIVE
BUN BLDV-MCNC: 6 MG/DL (ref 6–20)
CALCIUM SERPL-MCNC: 9.1 MG/DL (ref 8.6–10.2)
CANNABINOID SCREEN URINE: POSITIVE
CHLORIDE BLD-SCNC: 106 MMOL/L (ref 98–107)
CLARITY: CLEAR
CO2: 19 MMOL/L (ref 22–29)
COCAINE METABOLITE SCREEN URINE: NOT DETECTED
COLOR: YELLOW
CREAT SERPL-MCNC: 0.8 MG/DL (ref 0.5–1)
FENTANYL SCREEN, URINE: NOT DETECTED
GFR AFRICAN AMERICAN: >60
GFR NON-AFRICAN AMERICAN: >60 ML/MIN/1.73
GLUCOSE BLD-MCNC: 83 MG/DL (ref 74–99)
GLUCOSE URINE: NEGATIVE MG/DL
HCT VFR BLD CALC: 32.5 % (ref 34–48)
HEMOGLOBIN: 10.2 G/DL (ref 11.5–15.5)
KETONES, URINE: ABNORMAL MG/DL
LEUKOCYTE ESTERASE, URINE: NEGATIVE
Lab: ABNORMAL
MCH RBC QN AUTO: 24.9 PG (ref 26–35)
MCHC RBC AUTO-ENTMCNC: 31.4 % (ref 32–34.5)
MCV RBC AUTO: 79.5 FL (ref 80–99.9)
METHADONE SCREEN, URINE: NOT DETECTED
NITRITE, URINE: NEGATIVE
OPIATE SCREEN URINE: NOT DETECTED
OXYCODONE URINE: NOT DETECTED
PDW BLD-RTO: 15 FL (ref 11.5–15)
PH UA: 7.5 (ref 5–9)
PHENCYCLIDINE SCREEN URINE: NOT DETECTED
PLATELET # BLD: 219 E9/L (ref 130–450)
PMV BLD AUTO: 12 FL (ref 7–12)
POTASSIUM SERPL-SCNC: 3.9 MMOL/L (ref 3.5–5)
PROTEIN UA: NEGATIVE MG/DL
RBC # BLD: 4.09 E12/L (ref 3.5–5.5)
SODIUM BLD-SCNC: 135 MMOL/L (ref 132–146)
SPECIFIC GRAVITY UA: 1.02 (ref 1–1.03)
TOTAL PROTEIN: 7.1 G/DL (ref 6.4–8.3)
UROBILINOGEN, URINE: 2 E.U./DL
WBC # BLD: 13.1 E9/L (ref 4.5–11.5)

## 2020-10-11 PROCEDURE — 87081 CULTURE SCREEN ONLY: CPT

## 2020-10-11 PROCEDURE — 36415 COLL VENOUS BLD VENIPUNCTURE: CPT

## 2020-10-11 PROCEDURE — 86900 BLOOD TYPING SEROLOGIC ABO: CPT

## 2020-10-11 PROCEDURE — 2580000003 HC RX 258: Performed by: LEGAL MEDICINE

## 2020-10-11 PROCEDURE — 6360000002 HC RX W HCPCS: Performed by: LEGAL MEDICINE

## 2020-10-11 PROCEDURE — 87591 N.GONORRHOEAE DNA AMP PROB: CPT

## 2020-10-11 PROCEDURE — 88307 TISSUE EXAM BY PATHOLOGIST: CPT

## 2020-10-11 PROCEDURE — 80307 DRUG TEST PRSMV CHEM ANLYZR: CPT

## 2020-10-11 PROCEDURE — 80053 COMPREHEN METABOLIC PANEL: CPT

## 2020-10-11 PROCEDURE — 86850 RBC ANTIBODY SCREEN: CPT

## 2020-10-11 PROCEDURE — 86901 BLOOD TYPING SEROLOGIC RH(D): CPT

## 2020-10-11 PROCEDURE — 7200000001 HC VAGINAL DELIVERY

## 2020-10-11 PROCEDURE — 85027 COMPLETE CBC AUTOMATED: CPT

## 2020-10-11 PROCEDURE — 0UQMXZZ REPAIR VULVA, EXTERNAL APPROACH: ICD-10-PCS | Performed by: LEGAL MEDICINE

## 2020-10-11 PROCEDURE — 81003 URINALYSIS AUTO W/O SCOPE: CPT

## 2020-10-11 PROCEDURE — 6370000000 HC RX 637 (ALT 250 FOR IP): Performed by: LEGAL MEDICINE

## 2020-10-11 PROCEDURE — 87491 CHLMYD TRACH DNA AMP PROBE: CPT

## 2020-10-11 PROCEDURE — 2500000003 HC RX 250 WO HCPCS

## 2020-10-11 PROCEDURE — 6360000002 HC RX W HCPCS

## 2020-10-11 PROCEDURE — 1220000001 HC SEMI PRIVATE L&D R&B

## 2020-10-11 PROCEDURE — 87147 CULTURE TYPE IMMUNOLOGIC: CPT

## 2020-10-11 PROCEDURE — G0480 DRUG TEST DEF 1-7 CLASSES: HCPCS

## 2020-10-11 RX ORDER — SODIUM CHLORIDE, SODIUM LACTATE, POTASSIUM CHLORIDE, CALCIUM CHLORIDE 600; 310; 30; 20 MG/100ML; MG/100ML; MG/100ML; MG/100ML
INJECTION, SOLUTION INTRAVENOUS CONTINUOUS
Status: DISCONTINUED | OUTPATIENT
Start: 2020-10-11 | End: 2020-10-12 | Stop reason: HOSPADM

## 2020-10-11 RX ORDER — SODIUM CHLORIDE 0.9 % (FLUSH) 0.9 %
10 SYRINGE (ML) INJECTION EVERY 12 HOURS SCHEDULED
Status: DISCONTINUED | OUTPATIENT
Start: 2020-10-11 | End: 2020-10-11

## 2020-10-11 RX ORDER — METHYLERGONOVINE MALEATE 0.2 MG/ML
200 INJECTION INTRAVENOUS
Status: ACTIVE | OUTPATIENT
Start: 2020-10-11 | End: 2020-10-11

## 2020-10-11 RX ORDER — ACETAMINOPHEN 325 MG/1
650 TABLET ORAL EVERY 4 HOURS PRN
Status: DISCONTINUED | OUTPATIENT
Start: 2020-10-11 | End: 2020-10-11

## 2020-10-11 RX ORDER — ONDANSETRON 2 MG/ML
4 INJECTION INTRAMUSCULAR; INTRAVENOUS EVERY 6 HOURS PRN
Status: DISCONTINUED | OUTPATIENT
Start: 2020-10-11 | End: 2020-10-11

## 2020-10-11 RX ORDER — CHLORHEXIDINE GLUCONATE 4 G/100ML
SOLUTION TOPICAL
Status: DISCONTINUED
Start: 2020-10-11 | End: 2020-10-11

## 2020-10-11 RX ORDER — IBUPROFEN 400 MG/1
400 TABLET ORAL EVERY 6 HOURS PRN
Status: DISCONTINUED | OUTPATIENT
Start: 2020-10-11 | End: 2020-10-12 | Stop reason: HOSPADM

## 2020-10-11 RX ORDER — MODIFIED LANOLIN
OINTMENT (GRAM) TOPICAL PRN
Status: DISCONTINUED | OUTPATIENT
Start: 2020-10-11 | End: 2020-10-12 | Stop reason: HOSPADM

## 2020-10-11 RX ORDER — OXYCODONE HYDROCHLORIDE 5 MG/1
5 TABLET ORAL EVERY 4 HOURS PRN
Status: DISCONTINUED | OUTPATIENT
Start: 2020-10-11 | End: 2020-10-12 | Stop reason: HOSPADM

## 2020-10-11 RX ORDER — LIDOCAINE HYDROCHLORIDE 10 MG/ML
INJECTION, SOLUTION EPIDURAL; INFILTRATION; INTRACAUDAL; PERINEURAL
Status: DISCONTINUED
Start: 2020-10-11 | End: 2020-10-11

## 2020-10-11 RX ORDER — CARBOPROST TROMETHAMINE 250 UG/ML
250 INJECTION, SOLUTION INTRAMUSCULAR
Status: ACTIVE | OUTPATIENT
Start: 2020-10-11 | End: 2020-10-11

## 2020-10-11 RX ORDER — PENICILLIN G 3000000 [IU]/50ML
3 INJECTION, SOLUTION INTRAVENOUS EVERY 4 HOURS
Status: DISCONTINUED | OUTPATIENT
Start: 2020-10-11 | End: 2020-10-11

## 2020-10-11 RX ORDER — DOCUSATE SODIUM 100 MG/1
100 CAPSULE, LIQUID FILLED ORAL 2 TIMES DAILY
Status: DISCONTINUED | OUTPATIENT
Start: 2020-10-11 | End: 2020-10-11

## 2020-10-11 RX ORDER — ACETAMINOPHEN 500 MG
1000 TABLET ORAL EVERY 6 HOURS PRN
Status: DISCONTINUED | OUTPATIENT
Start: 2020-10-11 | End: 2020-10-12 | Stop reason: HOSPADM

## 2020-10-11 RX ORDER — FERROUS SULFATE 325(65) MG
325 TABLET ORAL
Status: DISCONTINUED | OUTPATIENT
Start: 2020-10-12 | End: 2020-10-12 | Stop reason: HOSPADM

## 2020-10-11 RX ORDER — SODIUM CHLORIDE 0.9 % (FLUSH) 0.9 %
10 SYRINGE (ML) INJECTION EVERY 12 HOURS SCHEDULED
Status: DISCONTINUED | OUTPATIENT
Start: 2020-10-11 | End: 2020-10-12 | Stop reason: HOSPADM

## 2020-10-11 RX ORDER — SODIUM CHLORIDE 0.9 % (FLUSH) 0.9 %
10 SYRINGE (ML) INJECTION PRN
Status: DISCONTINUED | OUTPATIENT
Start: 2020-10-11 | End: 2020-10-12 | Stop reason: HOSPADM

## 2020-10-11 RX ORDER — OXYTOCIN 10 [USP'U]/ML
INJECTION, SOLUTION INTRAMUSCULAR; INTRAVENOUS
Status: DISCONTINUED
Start: 2020-10-11 | End: 2020-10-11

## 2020-10-11 RX ORDER — SODIUM CHLORIDE 0.9 % (FLUSH) 0.9 %
10 SYRINGE (ML) INJECTION PRN
Status: DISCONTINUED | OUTPATIENT
Start: 2020-10-11 | End: 2020-10-11

## 2020-10-11 RX ORDER — DOCUSATE SODIUM 100 MG/1
100 CAPSULE, LIQUID FILLED ORAL 2 TIMES DAILY
Status: DISCONTINUED | OUTPATIENT
Start: 2020-10-11 | End: 2020-10-12 | Stop reason: HOSPADM

## 2020-10-11 RX ORDER — SODIUM CHLORIDE, SODIUM LACTATE, POTASSIUM CHLORIDE, CALCIUM CHLORIDE 600; 310; 30; 20 MG/100ML; MG/100ML; MG/100ML; MG/100ML
INJECTION, SOLUTION INTRAVENOUS CONTINUOUS
Status: DISCONTINUED | OUTPATIENT
Start: 2020-10-11 | End: 2020-10-11

## 2020-10-11 RX ADMIN — DEXTROSE MONOHYDRATE 5 MILLION UNITS: 5 INJECTION INTRAVENOUS at 17:55

## 2020-10-11 RX ADMIN — LIDOCAINE HYDROCHLORIDE: 10 INJECTION, SOLUTION EPIDURAL; INFILTRATION; INTRACAUDAL; PERINEURAL at 20:10

## 2020-10-11 RX ADMIN — OXYTOCIN 10 UNITS: 10 INJECTION INTRAVENOUS at 22:12

## 2020-10-11 RX ADMIN — SODIUM CHLORIDE, POTASSIUM CHLORIDE, SODIUM LACTATE AND CALCIUM CHLORIDE: 600; 310; 30; 20 INJECTION, SOLUTION INTRAVENOUS at 17:50

## 2020-10-11 RX ADMIN — AZITHROMYCIN 250 MG: 500 INJECTION, POWDER, LYOPHILIZED, FOR SOLUTION INTRAVENOUS at 19:51

## 2020-10-11 RX ADMIN — IBUPROFEN 400 MG: 400 TABLET, FILM COATED ORAL at 21:14

## 2020-10-11 ASSESSMENT — PAIN SCALES - GENERAL
PAINLEVEL_OUTOF10: 5
PAINLEVEL_OUTOF10: 3

## 2020-10-11 NOTE — PROGRESS NOTES
Assuming care of pt at this time. Fetal heart rate and uterine activity being monitored every 15 minutes by RN while pt in labor. Pt does not desire epidural, doing well with positioning and breathing through contrations. Encouragement given. Pt mother supportive at bedside. Stephen Escobar remains on unit, EFM visualized. Call light in reach, will continue monitoring.

## 2020-10-11 NOTE — PROGRESS NOTES
Patient moved to LD 5. Plan of care reviewed. Patient's mother in attendance and supportive. FHT and Uterine activity assessed q15 min per protocol.

## 2020-10-11 NOTE — PROGRESS NOTES
Dr. Morelia Espinoza at bedside for AROM, clear fluid. Reviewed fetal tracing, plan of care discussed.

## 2020-10-11 NOTE — PROGRESS NOTES
Notified Dr. Vladimir Nathan of patient arrival and co's. Reviewed BP, fetal and contraction tracing, SVE. Will admit for labor.

## 2020-10-11 NOTE — PROGRESS NOTES
Patient presents to unit with co contractions since 1600 today. Perceives fetal movement, denies bleeding or leaking of fluid.

## 2020-10-12 VITALS
TEMPERATURE: 97.8 F | RESPIRATION RATE: 18 BRPM | HEART RATE: 66 BPM | HEIGHT: 66 IN | BODY MASS INDEX: 29.41 KG/M2 | WEIGHT: 183 LBS | DIASTOLIC BLOOD PRESSURE: 62 MMHG | SYSTOLIC BLOOD PRESSURE: 118 MMHG

## 2020-10-12 LAB
FETAL SCREEN: NORMAL
HEMOGLOBIN: 9.8 G/DL (ref 11.5–15.5)
RHIG LOT NUMBER: NORMAL

## 2020-10-12 PROCEDURE — 36415 COLL VENOUS BLD VENIPUNCTURE: CPT

## 2020-10-12 PROCEDURE — 85018 HEMOGLOBIN: CPT

## 2020-10-12 PROCEDURE — 6360000002 HC RX W HCPCS: Performed by: LEGAL MEDICINE

## 2020-10-12 PROCEDURE — 96372 THER/PROPH/DIAG INJ SC/IM: CPT

## 2020-10-12 PROCEDURE — 85461 HEMOGLOBIN FETAL: CPT

## 2020-10-12 PROCEDURE — 6370000000 HC RX 637 (ALT 250 FOR IP): Performed by: LEGAL MEDICINE

## 2020-10-12 RX ADMIN — IBUPROFEN 400 MG: 400 TABLET, FILM COATED ORAL at 08:07

## 2020-10-12 RX ADMIN — HUMAN RHO(D) IMMUNE GLOBULIN 300 MCG: 300 INJECTION, SOLUTION INTRAMUSCULAR at 08:09

## 2020-10-12 RX ADMIN — FERROUS SULFATE TAB 325 MG (65 MG ELEMENTAL FE) 325 MG: 325 (65 FE) TAB at 08:07

## 2020-10-12 RX ADMIN — DOCUSATE SODIUM 100 MG: 100 CAPSULE, LIQUID FILLED ORAL at 08:07

## 2020-10-12 ASSESSMENT — PAIN DESCRIPTION - DESCRIPTORS: DESCRIPTORS: ACHING;SORE;DISCOMFORT

## 2020-10-12 ASSESSMENT — PAIN DESCRIPTION - PAIN TYPE: TYPE: ACUTE PAIN

## 2020-10-12 ASSESSMENT — PAIN DESCRIPTION - LOCATION: LOCATION: PERINEUM

## 2020-10-12 ASSESSMENT — PAIN DESCRIPTION - ONSET: ONSET: GRADUAL

## 2020-10-12 ASSESSMENT — PAIN - FUNCTIONAL ASSESSMENT: PAIN_FUNCTIONAL_ASSESSMENT: ACTIVITIES ARE NOT PREVENTED

## 2020-10-12 ASSESSMENT — PAIN DESCRIPTION - PROGRESSION: CLINICAL_PROGRESSION: NOT CHANGED

## 2020-10-12 ASSESSMENT — PAIN DESCRIPTION - FREQUENCY: FREQUENCY: INTERMITTENT

## 2020-10-12 ASSESSMENT — PAIN SCALES - GENERAL
PAINLEVEL_OUTOF10: 2
PAINLEVEL_OUTOF10: 8

## 2020-10-12 NOTE — PROGRESS NOTES
Pt assisted to bathroom to void and clean up at this time. Amanda care instructed. Returned to bed, PO fluids and snacks provided. Infant remains in nursery. No needs voiced, rest encouraged.

## 2020-10-12 NOTE — PLAN OF CARE
Problem: Pain:  Description: Pain management should include both nonpharmacologic and pharmacologic interventions.   Goal: Pain level will decrease  Description: Pain level will decrease  Outcome: Met This Shift  Goal: Control of acute pain  Description: Control of acute pain  Outcome: Met This Shift  Goal: Control of chronic pain  Description: Control of chronic pain  Outcome: Met This Shift     Problem: Anxiety:  Goal: Level of anxiety will decrease  Description: Level of anxiety will decrease  Outcome: Met This Shift     Problem: Breathing Pattern - Ineffective:  Goal: Able to breathe comfortably  Description: Able to breathe comfortably  Outcome: Met This Shift     Problem: Fluid Volume - Imbalance:  Goal: Absence of imbalanced fluid volume signs and symptoms  Description: Absence of imbalanced fluid volume signs and symptoms  Outcome: Met This Shift  Goal: Absence of intrapartum hemorrhage signs and symptoms  Description: Absence of intrapartum hemorrhage signs and symptoms  Outcome: Met This Shift     Problem: Infection - Intrapartum Infection:  Goal: Will show no infection signs and symptoms  Description: Will show no infection signs and symptoms  Outcome: Met This Shift     Problem: Labor Process - Prolonged:  Goal: Labor progression, first stage, within specified pattern  Description: Labor progression, first stage, within specified pattern  Outcome: Met This Shift  Goal: Labor progession, second stage, within specified pattern  Description: Labor progession, second stage, within specified pattern  Outcome: Met This Shift  Goal: Uterine contractions within specified parameters  Description: Uterine contractions within specified parameters  Outcome: Met This Shift     Problem:  Screening:  Goal: Ability to make informed decisions regarding treatment has improved  Description: Ability to make informed decisions regarding treatment has improved  Outcome: Met This Shift     Problem: Pain - Acute:  Goal: Pain level will decrease  Description: Pain level will decrease  Outcome: Met This Shift  Goal: Able to cope with pain  Description: Able to cope with pain  Outcome: Met This Shift     Problem: Tissue Perfusion - Uteroplacental, Altered:  Description: [TRUNCATED] For intrapartum patients with recurrent variable decelerations of the fetal heart rate, consider transcervical amnioinfusion. For patients in labor, avoid prophylactic use of continuous maternal oxygen supplementation to prevent nonreassu . ..   Goal: Absence of abnormal fetal heart rate pattern  Description: Absence of abnormal fetal heart rate pattern  Outcome: Met This Shift     Problem: Urinary Retention:  Goal: Experiences of bladder distention will decrease  Description: Experiences of bladder distention will decrease  Outcome: Met This Shift  Goal: Urinary elimination within specified parameters  Description: Urinary elimination within specified parameters  Outcome: Met This Shift

## 2020-10-12 NOTE — PROGRESS NOTES
at bedside, pt becoming increasingly more uncomfortable and losing control, yelling she \"needs her out of me\". Pt prepared for delivery, pt mother supportive at bedside. RN remains at bedside and pt instructed to push.

## 2020-10-12 NOTE — OP NOTE
[address] [place]                                OPERATIVE REPORT    PATIENT NAMEViola Arreaga                    :        2000  MED REC NO:   [id number]                            ROOM:       05  ACCOUNT NO:   [de-identified]                           ADMIT DATE: 10/11/2020. PROVIDER:     Martin Oropeza MD    DATE OF PROCEDURE:  10/11/2020    The patient felt the urge to push. She was placed in Arnie  position. She was able to deliver the fetal head. Fetal trunk was  delivered with minimal traction applied in an axis parallel to the fetal  spine after the shoulder had cleared the pubic bone. Nares and  hypopharynx were suctioned. Cord was clamped and cut. Infant was  crying and vigorous. Cord gases and blood samples were obtained. Placenta was removed spontaneously. Note was made of an intact two  artery, one vein cord placenta, which was sent to pathology. No  cervical or vaginal lacerations were noted. Repair of right labial  laceration was performed in a running fashion. Fundus was firm. Estimated blood loss was 300 mL. For exam of the infant, please refer  to pediatrician's notes. The cord arterial gas pH is 7.347, base excess  -3.1. Cord venous gas pH 7.381, base excess -3.4. Mother and infant  went to recovery room in stable condition.         Corine Figueroa MD    D: 10/11/2020 20:33:26       T: 10/11/2020 20:35:33     [email or web address]  Job#: [id number]     Doc#: [id number]    CC:

## 2020-10-12 NOTE — PLAN OF CARE
will decrease  10/12/2020 0908 by Mariya Moe RN  Outcome: Completed  10/12/2020 0216 by Eliot Najera RN  Outcome: Met This Shift  Goal: Urinary elimination within specified parameters  Description: Urinary elimination within specified parameters  10/12/2020 0908 by Mariya Moe RN  Outcome: Completed  10/12/2020 0216 by Eliot Najera RN  Outcome: Met This Shift

## 2020-10-12 NOTE — H&P
[address]                              HISTORY AND PHYSICAL    PATIENT NAME: Neli Stevens                    :        2000  MED REC NO:   [id number]                            ROOM:       LD05  ACCOUNT NO:   [de-identified]                           ADMIT DATE: 10/11/2020. PROVIDER:     Bethanie Mac MD    HISTORY OF PRESENT ILLNESS:   78-year-old black female, para 0-1-0-1,  [facility] 2020, presents on 10/11/2020 with complaints of regular  uterine contractions since 1600. No leaking fluid or vaginal bleeding. No change in fetal movements. No viral prodrome. No vulvar lesions. Had intercourse yesterday. Has been noncompliant with antepartum  visits. Has a history of a short cervix with her previous pregnancy  noted at 32 weeks and she delivered that infant at 36-1/2 weeks. PAST MEDICAL HISTORY:  Significant for being an SMA carrier. She also  had Chlamydia earlier in the pregnancy. PAST SURGICAL HISTORY:  Negative. PAST GYNECOLOGIC HISTORY:  History of Chlamydia. No DVT, PE, or other  STDs. SOCIAL HISTORY:  Positive for tobacco one-pack per day for 4 years. Denies alcohol or drugs. Previous drug screens have manifested  marijuana. FAMILY HISTORY:  Noncontributory. Her partner was not tested for SMA. REVIEW OF SYSTEMS:  Negative for cardiac, respiratory, GI, or   abnormalities. PHYSICAL EXAMINATION:  VITAL SIGNS:  Stable. Blood pressure 112/53, pulse 70, respiratory rate  16, and temperature 97.1. ABDOMEN:  Fetal heart tones are present in the 130s with hwnb-hz-tmah  variability present. Accelerations noted. Cervix is 7, complete vertex  and -1 station. Contractions are every 2 minutes. Estimated fetal  weight is 3200 gm. EXTREMITIES:  No clubbing or cyanosis. 1+ edema. NEUROLOGIC:  CN II-XII grossly intact. She is alert and oriented x4.     LABORATORY DATA: White count 13.1, hemoglobin 10.2, and platelets  396,290. Urine drug screen shows marijuana. ALT less than 5, AST 14. Creatinine 0.8. Glucose 83. ASSESSMENT:  Intrauterine pregnancy at 36-1/2 weeks in labor. Recent  intercourse. History of Chlamydia. Noncompliant with antepartum  visits. PLAN:  Risks of vaginal delivery and operative delivery have been  reviewed with her. Indications for operative delivery have been  reviewed with her. Shoulder dystocia and birth trauma have been  reviewed with her. All her questions have been answered and she wishes  to proceed with attempt at vaginal delivery. GBS chemoprophylaxis has  been initiated. Gonorrhea and Chlamydia chemoprophylaxis have been  initiated.         Magy Stubbs MD    D: 10/11/2020 18:58:57       T: 10/11/2020 19:01:31     [email or web address]  Job#: [id number]     Doc#: [id number]    CC:

## 2020-10-12 NOTE — PROGRESS NOTES
Discharge instructions reviewed with patient. Verbalized understanding. No questions voiced when asked. Teach back medications successful.

## 2020-10-15 LAB
C. TRACHOMATIS DNA ,URINE: ABNORMAL
CANNABINOIDS CONF, URINE: 100 NG/ML
GROUP B STREP CULTURE: NORMAL
N. GONORRHOEAE DNA, URINE: NEGATIVE
SOURCE: ABNORMAL

## 2021-11-24 ENCOUNTER — HOSPITAL ENCOUNTER (OUTPATIENT)
Age: 21
Discharge: ANOTHER HOSPITAL | End: 2021-11-24
Attending: LEGAL MEDICINE | Admitting: OBSTETRICS & GYNECOLOGY
Payer: MEDICAID

## 2021-11-24 VITALS
HEIGHT: 66 IN | SYSTOLIC BLOOD PRESSURE: 117 MMHG | DIASTOLIC BLOOD PRESSURE: 61 MMHG | RESPIRATION RATE: 17 BRPM | HEART RATE: 82 BPM | BODY MASS INDEX: 23.3 KG/M2 | WEIGHT: 145 LBS | OXYGEN SATURATION: 98 % | TEMPERATURE: 98.8 F

## 2021-11-24 PROBLEM — O26.92 COMPLICATED PREGNANCY, SECOND TRIMESTER: Status: ACTIVE | Noted: 2021-11-24

## 2021-11-24 LAB
ALBUMIN SERPL-MCNC: 3.4 G/DL (ref 3.5–5.2)
ALP BLD-CCNC: 84 U/L (ref 35–104)
ALT SERPL-CCNC: 5 U/L (ref 0–32)
AMNISURE, POC: POSITIVE
AMPHETAMINE SCREEN, URINE: NOT DETECTED
ANION GAP SERPL CALCULATED.3IONS-SCNC: 11 MMOL/L (ref 7–16)
AST SERPL-CCNC: 9 U/L (ref 0–31)
BACTERIA: ABNORMAL /HPF
BARBITURATE SCREEN URINE: NOT DETECTED
BASOPHILS ABSOLUTE: 0.03 E9/L (ref 0–0.2)
BASOPHILS RELATIVE PERCENT: 0.2 % (ref 0–2)
BENZODIAZEPINE SCREEN, URINE: NOT DETECTED
BILIRUB SERPL-MCNC: <0.2 MG/DL (ref 0–1.2)
BILIRUBIN DIRECT: <0.2 MG/DL (ref 0–0.3)
BILIRUBIN URINE: NEGATIVE
BILIRUBIN, INDIRECT: ABNORMAL MG/DL (ref 0–1)
BLOOD, URINE: NEGATIVE
BUN BLDV-MCNC: 6 MG/DL (ref 6–20)
CALCIUM SERPL-MCNC: 8.6 MG/DL (ref 8.6–10.2)
CANNABINOID SCREEN URINE: NOT DETECTED
CHLORIDE BLD-SCNC: 107 MMOL/L (ref 98–107)
CLARITY: ABNORMAL
CO2: 19 MMOL/L (ref 22–29)
COCAINE METABOLITE SCREEN URINE: NOT DETECTED
COLOR: YELLOW
CREAT SERPL-MCNC: 0.6 MG/DL (ref 0.5–1)
EOSINOPHILS ABSOLUTE: 0.2 E9/L (ref 0.05–0.5)
EOSINOPHILS RELATIVE PERCENT: 1.3 % (ref 0–6)
EPITHELIAL CELLS, UA: ABNORMAL /HPF
FENTANYL SCREEN, URINE: NOT DETECTED
FETAL FIBRONECTIN: POSITIVE
GFR AFRICAN AMERICAN: >60
GFR NON-AFRICAN AMERICAN: >60 ML/MIN/1.73
GLUCOSE BLD-MCNC: 94 MG/DL (ref 74–99)
GLUCOSE URINE: NEGATIVE MG/DL
HCT VFR BLD CALC: 27.8 % (ref 34–48)
HEMOGLOBIN: 9.2 G/DL (ref 11.5–15.5)
IMMATURE GRANULOCYTES #: 0.37 E9/L
IMMATURE GRANULOCYTES %: 2.3 % (ref 0–5)
KETONES, URINE: ABNORMAL MG/DL
LEUKOCYTE ESTERASE, URINE: ABNORMAL
LYMPHOCYTES ABSOLUTE: 2.68 E9/L (ref 1.5–4)
LYMPHOCYTES RELATIVE PERCENT: 17 % (ref 20–42)
Lab: ABNORMAL
Lab: NORMAL
MCH RBC QN AUTO: 26.4 PG (ref 26–35)
MCHC RBC AUTO-ENTMCNC: 33.1 % (ref 32–34.5)
MCV RBC AUTO: 79.7 FL (ref 80–99.9)
METHADONE SCREEN, URINE: NOT DETECTED
MONOCYTES ABSOLUTE: 1.13 E9/L (ref 0.1–0.95)
MONOCYTES RELATIVE PERCENT: 7.2 % (ref 2–12)
NEGATIVE QC PASS/FAIL: ABNORMAL
NEUTROPHILS ABSOLUTE: 11.37 E9/L (ref 1.8–7.3)
NEUTROPHILS RELATIVE PERCENT: 72 % (ref 43–80)
NITRITE, URINE: NEGATIVE
OPIATE SCREEN URINE: NOT DETECTED
OXYCODONE URINE: NOT DETECTED
PDW BLD-RTO: 15.2 FL (ref 11.5–15)
PH UA: 6.5 (ref 5–9)
PHENCYCLIDINE SCREEN URINE: NOT DETECTED
PLATELET # BLD: 241 E9/L (ref 130–450)
PMV BLD AUTO: 11.1 FL (ref 7–12)
POSITIVE QC PASS/FAIL: ABNORMAL
POTASSIUM REFLEX MAGNESIUM: 3.9 MMOL/L (ref 3.5–5)
PROTEIN UA: ABNORMAL MG/DL
RAPID HIV 1&2: NORMAL
RBC # BLD: 3.49 E12/L (ref 3.5–5.5)
RBC UA: ABNORMAL /HPF (ref 0–2)
SODIUM BLD-SCNC: 137 MMOL/L (ref 132–146)
SPECIFIC GRAVITY UA: 1.02 (ref 1–1.03)
TOTAL PROTEIN: 6.3 G/DL (ref 6.4–8.3)
UROBILINOGEN, URINE: 2 E.U./DL
WBC # BLD: 15.8 E9/L (ref 4.5–11.5)
WBC UA: ABNORMAL /HPF (ref 0–5)

## 2021-11-24 PROCEDURE — 86901 BLOOD TYPING SEROLOGIC RH(D): CPT

## 2021-11-24 PROCEDURE — 99213 OFFICE O/P EST LOW 20 MIN: CPT

## 2021-11-24 PROCEDURE — 96365 THER/PROPH/DIAG IV INF INIT: CPT

## 2021-11-24 PROCEDURE — 6360000002 HC RX W HCPCS: Performed by: OBSTETRICS & GYNECOLOGY

## 2021-11-24 PROCEDURE — 80307 DRUG TEST PRSMV CHEM ANLYZR: CPT

## 2021-11-24 PROCEDURE — 85025 COMPLETE CBC W/AUTO DIFF WBC: CPT

## 2021-11-24 PROCEDURE — 80048 BASIC METABOLIC PNL TOTAL CA: CPT

## 2021-11-24 PROCEDURE — 86762 RUBELLA ANTIBODY: CPT

## 2021-11-24 PROCEDURE — 87147 CULTURE TYPE IMMUNOLOGIC: CPT

## 2021-11-24 PROCEDURE — 2580000003 HC RX 258: Performed by: OBSTETRICS & GYNECOLOGY

## 2021-11-24 PROCEDURE — 87081 CULTURE SCREEN ONLY: CPT

## 2021-11-24 PROCEDURE — 86900 BLOOD TYPING SEROLOGIC ABO: CPT

## 2021-11-24 PROCEDURE — 86701 HIV-1ANTIBODY: CPT

## 2021-11-24 PROCEDURE — 87340 HEPATITIS B SURFACE AG IA: CPT

## 2021-11-24 PROCEDURE — 96376 TX/PRO/DX INJ SAME DRUG ADON: CPT

## 2021-11-24 PROCEDURE — 96375 TX/PRO/DX INJ NEW DRUG ADDON: CPT

## 2021-11-24 PROCEDURE — 87491 CHLMYD TRACH DNA AMP PROBE: CPT

## 2021-11-24 PROCEDURE — 96368 THER/DIAG CONCURRENT INF: CPT

## 2021-11-24 PROCEDURE — 87591 N.GONORRHOEAE DNA AMP PROB: CPT

## 2021-11-24 PROCEDURE — 82731 ASSAY OF FETAL FIBRONECTIN: CPT

## 2021-11-24 PROCEDURE — 36415 COLL VENOUS BLD VENIPUNCTURE: CPT

## 2021-11-24 PROCEDURE — 96372 THER/PROPH/DIAG INJ SC/IM: CPT

## 2021-11-24 PROCEDURE — 86850 RBC ANTIBODY SCREEN: CPT

## 2021-11-24 PROCEDURE — 80076 HEPATIC FUNCTION PANEL: CPT

## 2021-11-24 PROCEDURE — 81001 URINALYSIS AUTO W/SCOPE: CPT

## 2021-11-24 RX ORDER — CALCIUM GLUCONATE 94 MG/ML
1000 INJECTION, SOLUTION INTRAVENOUS PRN
Status: DISCONTINUED | OUTPATIENT
Start: 2021-11-24 | End: 2021-11-25 | Stop reason: HOSPADM

## 2021-11-24 RX ORDER — SODIUM CHLORIDE 0.9 % (FLUSH) 0.9 %
5-40 SYRINGE (ML) INJECTION PRN
Status: DISCONTINUED | OUTPATIENT
Start: 2021-11-24 | End: 2021-11-25 | Stop reason: HOSPADM

## 2021-11-24 RX ORDER — SODIUM CHLORIDE, SODIUM LACTATE, POTASSIUM CHLORIDE, CALCIUM CHLORIDE 600; 310; 30; 20 MG/100ML; MG/100ML; MG/100ML; MG/100ML
INJECTION, SOLUTION INTRAVENOUS CONTINUOUS
Status: DISCONTINUED | OUTPATIENT
Start: 2021-11-24 | End: 2021-11-25 | Stop reason: HOSPADM

## 2021-11-24 RX ORDER — BETAMETHASONE SODIUM PHOSPHATE AND BETAMETHASONE ACETATE 3; 3 MG/ML; MG/ML
12 INJECTION, SUSPENSION INTRA-ARTICULAR; INTRALESIONAL; INTRAMUSCULAR; SOFT TISSUE ONCE
Status: COMPLETED | OUTPATIENT
Start: 2021-11-24 | End: 2021-11-24

## 2021-11-24 RX ORDER — SODIUM CHLORIDE 9 MG/ML
25 INJECTION, SOLUTION INTRAVENOUS PRN
Status: DISCONTINUED | OUTPATIENT
Start: 2021-11-24 | End: 2021-11-25 | Stop reason: HOSPADM

## 2021-11-24 RX ORDER — MAGNESIUM SULFATE IN WATER 40 MG/ML
4000 INJECTION, SOLUTION INTRAVENOUS ONCE
Status: COMPLETED | OUTPATIENT
Start: 2021-11-24 | End: 2021-11-24

## 2021-11-24 RX ORDER — SODIUM CHLORIDE 0.9 % (FLUSH) 0.9 %
5-40 SYRINGE (ML) INJECTION EVERY 12 HOURS SCHEDULED
Status: DISCONTINUED | OUTPATIENT
Start: 2021-11-24 | End: 2021-11-25 | Stop reason: HOSPADM

## 2021-11-24 RX ADMIN — MAGNESIUM SULFATE HEPTAHYDRATE 4000 MG: 40 INJECTION, SOLUTION INTRAVENOUS at 21:45

## 2021-11-24 RX ADMIN — BETAMETHASONE SODIUM PHOSPHATE AND BETAMETHASONE ACETATE 12 MG: 3; 3 INJECTION, SUSPENSION INTRA-ARTICULAR; INTRALESIONAL; INTRAMUSCULAR at 21:59

## 2021-11-24 RX ADMIN — SODIUM CHLORIDE, POTASSIUM CHLORIDE, SODIUM LACTATE AND CALCIUM CHLORIDE: 600; 310; 30; 20 INJECTION, SOLUTION INTRAVENOUS at 21:40

## 2021-11-24 RX ADMIN — AZITHROMYCIN MONOHYDRATE 500 MG: 500 INJECTION, POWDER, LYOPHILIZED, FOR SOLUTION INTRAVENOUS at 22:43

## 2021-11-24 RX ADMIN — MAGNESIUM SULFATE HEPTAHYDRATE 2000 MG/HR: 40 INJECTION, SOLUTION INTRAVENOUS at 22:04

## 2021-11-25 ENCOUNTER — HOSPITAL ENCOUNTER (INPATIENT)
Age: 21
LOS: 7 days | Discharge: LEFT BEFORE DISCHARGE | DRG: 566 | End: 2021-12-02
Attending: OBSTETRICS & GYNECOLOGY | Admitting: OBSTETRICS & GYNECOLOGY
Payer: MEDICAID

## 2021-11-25 ENCOUNTER — ANCILLARY PROCEDURE (OUTPATIENT)
Dept: OBGYN CLINIC | Age: 21
DRG: 566 | End: 2021-11-25
Payer: MEDICAID

## 2021-11-25 PROBLEM — O09.33 NO PRENATAL CARE IN CURRENT PREGNANCY IN THIRD TRIMESTER: Status: ACTIVE | Noted: 2021-11-25

## 2021-11-25 PROBLEM — O42.90 PREMATURE RUPTURE OF MEMBRANES: Status: ACTIVE | Noted: 2021-11-25

## 2021-11-25 PROBLEM — O09.899 H/O PRETERM DELIVERY, CURRENTLY PREGNANT: Status: ACTIVE | Noted: 2021-11-25

## 2021-11-25 PROBLEM — Z64.1 MULTIPARITY: Status: ACTIVE | Noted: 2021-11-25

## 2021-11-25 LAB
ABO/RH: NORMAL
ABO/RH: NORMAL
ANTIBODY SCREEN: NORMAL
ANTIBODY SCREEN: NORMAL
RHIG LOT NUMBER: NORMAL
SARS-COV-2, NAAT: NOT DETECTED

## 2021-11-25 PROCEDURE — 86900 BLOOD TYPING SEROLOGIC ABO: CPT

## 2021-11-25 PROCEDURE — 1220000000 HC SEMI PRIVATE OB R&B

## 2021-11-25 PROCEDURE — 87635 SARS-COV-2 COVID-19 AMP PRB: CPT

## 2021-11-25 PROCEDURE — 99253 IP/OBS CNSLTJ NEW/EST LOW 45: CPT | Performed by: OBSTETRICS & GYNECOLOGY

## 2021-11-25 PROCEDURE — 86901 BLOOD TYPING SEROLOGIC RH(D): CPT

## 2021-11-25 PROCEDURE — 36415 COLL VENOUS BLD VENIPUNCTURE: CPT

## 2021-11-25 PROCEDURE — 6370000000 HC RX 637 (ALT 250 FOR IP): Performed by: OBSTETRICS & GYNECOLOGY

## 2021-11-25 PROCEDURE — 76819 FETAL BIOPHYS PROFIL W/O NST: CPT | Performed by: OBSTETRICS & GYNECOLOGY

## 2021-11-25 PROCEDURE — 76805 OB US >/= 14 WKS SNGL FETUS: CPT | Performed by: OBSTETRICS & GYNECOLOGY

## 2021-11-25 PROCEDURE — 2580000003 HC RX 258: Performed by: OBSTETRICS & GYNECOLOGY

## 2021-11-25 PROCEDURE — 76820 UMBILICAL ARTERY ECHO: CPT | Performed by: OBSTETRICS & GYNECOLOGY

## 2021-11-25 PROCEDURE — 6360000002 HC RX W HCPCS: Performed by: OBSTETRICS & GYNECOLOGY

## 2021-11-25 PROCEDURE — 86850 RBC ANTIBODY SCREEN: CPT

## 2021-11-25 RX ORDER — BETAMETHASONE SODIUM PHOSPHATE AND BETAMETHASONE ACETATE 3; 3 MG/ML; MG/ML
12 INJECTION, SUSPENSION INTRA-ARTICULAR; INTRALESIONAL; INTRAMUSCULAR; SOFT TISSUE ONCE
Status: COMPLETED | OUTPATIENT
Start: 2021-11-25 | End: 2021-11-25

## 2021-11-25 RX ORDER — ACETAMINOPHEN 325 MG/1
650 TABLET ORAL EVERY 4 HOURS PRN
Status: DISCONTINUED | OUTPATIENT
Start: 2021-11-25 | End: 2021-12-02 | Stop reason: HOSPADM

## 2021-11-25 RX ORDER — ONDANSETRON 2 MG/ML
8 INJECTION INTRAMUSCULAR; INTRAVENOUS EVERY 8 HOURS PRN
Status: DISCONTINUED | OUTPATIENT
Start: 2021-11-25 | End: 2021-12-02 | Stop reason: HOSPADM

## 2021-11-25 RX ORDER — AMOXICILLIN 250 MG/1
250 CAPSULE ORAL EVERY 8 HOURS SCHEDULED
Status: DISCONTINUED | OUTPATIENT
Start: 2021-11-27 | End: 2021-12-02 | Stop reason: HOSPADM

## 2021-11-25 RX ORDER — AZITHROMYCIN 250 MG/1
250 TABLET, FILM COATED ORAL DAILY
Status: COMPLETED | OUTPATIENT
Start: 2021-11-25 | End: 2021-11-30

## 2021-11-25 RX ORDER — FAMOTIDINE 20 MG/1
20 TABLET, FILM COATED ORAL 2 TIMES DAILY
Status: DISCONTINUED | OUTPATIENT
Start: 2021-11-25 | End: 2021-12-02 | Stop reason: HOSPADM

## 2021-11-25 RX ORDER — SODIUM CHLORIDE, SODIUM LACTATE, POTASSIUM CHLORIDE, CALCIUM CHLORIDE 600; 310; 30; 20 MG/100ML; MG/100ML; MG/100ML; MG/100ML
INJECTION, SOLUTION INTRAVENOUS CONTINUOUS
Status: DISCONTINUED | OUTPATIENT
Start: 2021-11-25 | End: 2021-12-02 | Stop reason: HOSPADM

## 2021-11-25 RX ORDER — FERROUS SULFATE 325(65) MG
325 TABLET ORAL 2 TIMES DAILY WITH MEALS
Status: DISCONTINUED | OUTPATIENT
Start: 2021-11-25 | End: 2021-11-28

## 2021-11-25 RX ADMIN — FERROUS SULFATE TAB 325 MG (65 MG ELEMENTAL FE) 325 MG: 325 (65 FE) TAB at 20:17

## 2021-11-25 RX ADMIN — BETAMETHASONE SODIUM PHOSPHATE AND BETAMETHASONE ACETATE 12 MG: 3; 3 INJECTION, SUSPENSION INTRA-ARTICULAR; INTRALESIONAL; INTRAMUSCULAR at 22:03

## 2021-11-25 RX ADMIN — ENOXAPARIN SODIUM 40 MG: 100 INJECTION SUBCUTANEOUS at 09:30

## 2021-11-25 RX ADMIN — AMPICILLIN SODIUM 1000 MG: 1 INJECTION, POWDER, FOR SOLUTION INTRAMUSCULAR; INTRAVENOUS at 05:38

## 2021-11-25 RX ADMIN — FAMOTIDINE 20 MG: 20 TABLET ORAL at 09:34

## 2021-11-25 RX ADMIN — SODIUM CHLORIDE, POTASSIUM CHLORIDE, SODIUM LACTATE AND CALCIUM CHLORIDE: 600; 310; 30; 20 INJECTION, SOLUTION INTRAVENOUS at 16:15

## 2021-11-25 RX ADMIN — FAMOTIDINE 20 MG: 20 TABLET ORAL at 20:17

## 2021-11-25 RX ADMIN — HUMAN RHO(D) IMMUNE GLOBULIN 300 MCG: 300 INJECTION, SOLUTION INTRAMUSCULAR at 22:14

## 2021-11-25 RX ADMIN — MAGNESIUM SULFATE HEPTAHYDRATE 2000 MG/HR: 40 INJECTION, SOLUTION INTRAVENOUS at 07:47

## 2021-11-25 RX ADMIN — AZITHROMYCIN 250 MG: 250 TABLET, FILM COATED ORAL at 13:57

## 2021-11-25 RX ADMIN — AMPICILLIN SODIUM 1000 MG: 1 INJECTION, POWDER, FOR SOLUTION INTRAMUSCULAR; INTRAVENOUS at 20:20

## 2021-11-25 RX ADMIN — MAGNESIUM SULFATE HEPTAHYDRATE 2000 MG/HR: 40 INJECTION, SOLUTION INTRAVENOUS at 18:36

## 2021-11-25 NOTE — H&P
polyphagia, polydipsia or polyuria  Hem: no increased bleeding, no bruising, no lymphadenopathy  Skin: no skin changes  Psych: no depressed mood, no suicidal ideation    Social History:     reports that she has quit smoking. Her smoking use included cigarettes. She has never used smokeless tobacco. She reports that she does not drink alcohol and does not use drugs. Medications Prior to Admission:  No medications prior to admission. Allergies:  Patient has no known allergies. PHYSICAL EXAM:  LMP 05/01/2021   General appearance: Comfortable  Lungs:  CTA   Heart:  Regular Rhythm  Abdomen:  Soft, non-tender, gravid  Fetal heart rate:  Baseline Heart Rate 120, accelerations:     Contraction frequency:  none  Membranes:  Ruptured       ASSESSMENT     IUP at 29 4/7 weeks.     pprom         Plan: nurse notified dr Halie Aguilera and orders recieved        Electronically signed by Riri Flores MD on 11/25/2021 at 1:02 AM

## 2021-11-25 NOTE — PROGRESS NOTES
Dr Jennifer Clark updated on pt. FHTs reviewed. Minimal variability with periods of moderate variability. No decelerations.  Antibiotic orders modified per dr Lisa Chaudhary

## 2021-11-25 NOTE — PROGRESS NOTES
Pt off unit via stretcher with EMTs for ambulance transfer to [facility] L&D in stable condition, undelivered at this time.

## 2021-11-25 NOTE — PROGRESS NOTES
at bedside 2144. Bedside US performed and POC for transfer to [address] L&D discussed with pt. Pt verbalized understanding of care plan. RN x2 at bedside, MgSO4 bolus initiated per orders.

## 2021-11-25 NOTE — PROGRESS NOTES
RN to bedside, informed that pt has had no prenatal care for this pregnancy and that perineum does appear wet. Amnisure performed and swabs and cultures obtained. SVE closed and posterior.

## 2021-11-25 NOTE — PROGRESS NOTES
Pt returned to room from [facility]. EFM applied, vital signs obtained. Pt denies headache, changes in vision, epigastric pain, and N/V. Pt states her stomach hurts but doesn't know if it is contractions. Rates pain 8/10. Pt located cramping in upper abdomen, not near fundus/ uterus. toco adjusted. Pt denies vaginal bleeding and lof. Perceives positive fetal movement. Call light within reach.

## 2021-11-25 NOTE — PROGRESS NOTES
Upon entering room, pt very upset. States \"you dont understand. I have 2 kids at home. I have no one to watch my kids. I have to go home. \". RN explained to pt that Dr Kayce Tipton was already notified of her desire to leave, and that she would be admitted until delivery. Pt requesting to speak with a doctor. Dr Aleshia Bynum called and notified of pt stating that she needs to leave and asking about discharge. Pt may not be discharged, and would have to sign out against medical advice to leave. Dr Elder Funez transferred to pt's in room phone to speak with her regarding admission and risks of signing out [facility] [place].

## 2021-11-25 NOTE — PROGRESS NOTES
GP: 2/1     EDC: 2/4/21    Patient arrived ambulatory to the Labor and Delivery unit with complaints of SROM at 3pm.  EFM applied and fetal well being established. Patient assessed and information obtained about health and history. Patient oriented to room and call light.

## 2021-11-25 NOTE — PROGRESS NOTES
called as house OB with new patient arrival and complaints. Notified of SROM, see new orders for MgSO4 admnistration at this time. Will be in to transfer patient shortly.

## 2021-11-25 NOTE — PROGRESS NOTES
RE: Efraín Mcgraw  : 2000   AGE: 19 y.o. This report has been created using voice recognition software. It may contain errors which are inherent in voice recognition technology. Dear Doctor:    Austin Cotton had a consultation today for the following indications:    Patient Active Problem List   Diagnosis    Multiparity    Premature rupture of membranes     premature rupture of membranes (PPROM) with onset of labor after 24 hours of rupture in third trimester, antepartum    No prenatal care in current pregnancy in third trimester    H/O  delivery, currently pregnant     Austin Cotton is a 19 y.o. female, who is G3(0,2,0,2). She has an Estimated Date of Delivery: 22 based on her LMP. She is currently 29 weeks 5 days gestation based on that assessment. The patient has had no prenatal care with her current pregnancy. The patient was transferred to [facility] secondary to  premature rupture of the amniotic membranes on 2021. The patient was considering termination of pregnancy or adoption. She had 2 prior  deliveries. She has had no fever or chills. She had no abdominal trauma. She had no abdominal pain or tenderness. Her fetal movement has been good. The fetal heart rate tracing shows minimal variability with accelerations. Occasional variable decelerations are noted. The tracing is category 2. A fetal ultrasound evaluation was performed and a detailed report included in the EMR under the imaging tab from today's date. A living franco intrauterine fetus was identified in the cephalic presentation, with normal fetal heart motion and normal fetal motion noted. The amniotic fluid index was 2.3 cm consistent with oligohydramnios. Visualization of the fetal anatomy was limited secondary to the decreased amniotic fluid volume.   The biometric measurements were consistent with an estimated fetal weight of 2 pounds 11 ounces which places the fetus at the 29th growth percentile based on her estimated date of delivery established by her last menstrual period. The patient had no prior ultrasound evaluations during this pregnancy. Biometric measurements at this late gestational age are not reliable indicators of gestational age. The biophysical profile was scored 6 of 8, with 2 points deducted for the decreased amniotic fluid volume. The cord Doppler SD ratios were within normal limits for her gestational age. There was no evidence of absence, or reversal of end-diastolic flow. GENETIC SCREENING/TERATOLOGY COUNSELING              (Includes patient, FTB, and any affected family members)    Patient Age > 35 Years NO   Thalassemia ( MVC<80) NO   Congential Heart Defect NO   Neural Tube Defect NO   Armen-Sachs NO   Sickle Cell Disease NO   Sickle Cell Trait NO   Sickle C Disease or Trait NO   Hemophilia NO   Muscular Dystrophy NO   Cystic Fibrosis NO   York Disease NO   Autism NO   Mental Retardation NO   History of Fragile X NO   Maternal Diabetes NO   Other Genetic Disease or Syndrome NO   Previous Child With Congenital Abnormality Not Listed NO   Recreational Drugs NO                                                                 INFECTION HISTORY     HEPATITIS IMMUNIZED:  NO   HEPATITIS INFECTION:  NO   EXPOSURE TO TB NO   PARVOVIRUS B-19 NO   CHICKEN POX  NO   MEASLES NO   HIV NO   OTHER RASH OR VIRAL ILLNESS SINCE LMP NO   UTI RECURRENT NO   HPV NO     OB History    Para Term  AB Living   3 2   2   2   SAB IAB Ectopic Molar Multiple Live Births           0 2      # Outcome Date GA Lbr Michael/2nd Weight Sex Delivery Anes PTL Lv   3 Current            2  10/11/20 36w4d 01:03 / 00:11 5 lb 10.3 oz (2.56 kg) F Vag-Spont None Y SUJATA   1  19 36w5d 10:45 / 00:14 6 lb 6 oz (2.892 kg) M Vag-Spont Local N SUJATA     PAST GYNECOLOGICAL  HISTORY:  Negative for abnormal pap smears. Negative for sexually transmitted diseases. Negative for cervical LEEP / conization /cryosurgery. Negative for uterine surgery. Negative for ovarian or tubal surgery. History reviewed. No pertinent past medical history. History reviewed. No pertinent surgical history. No Known Allergies      Current Facility-Administered Medications:     acetaminophen (TYLENOL) tablet 650 mg, 650 mg, Oral, Q4H PRN, Jaime Medina MD    ondansetron [facility]) injection 8 mg, 8 mg, IntraVENous, Q8H PRN, Jaime Medina MD    famotidine (PEPCID) tablet 20 mg, 20 mg, Oral, BID, Jaime Medina MD, 20 mg at 11/25/21 0934    enoxaparin (LOVENOX) injection 40 mg, 40 mg, SubCUTAneous, Daily, Jaime Medina MD, 40 mg at 11/25/21 0930    magnesium sulfate (64784 mg/500mL infusion), 2,000 mg/hr, IntraVENous, Continuous, Jaime Medina MD, Last Rate: 50 mL/hr at 11/25/21 1836, 2,000 mg/hr at 11/25/21 1836    betamethasone acetate-betamethasone sodium phosphate (CELESTONE) injection 12 mg, 12 mg, IntraMUSCular, Once, MD Lj Artjohn Connellyo  [START ON 11/27/2021] amoxicillin (AMOXIL) capsule 250 mg, 250 mg, Oral, 3 times per day, Al Hernandez MD    [facility]) tablet 250 mg, 250 mg, Oral, Daily, Al Hernandez MD, 250 mg at 11/25/21 1357    lactated ringers infusion, , IntraVENous, Continuous, Jaime Medina MD, Last Rate: 75 mL/hr at 11/25/21 1615, New Bag at 11/25/21 1615    ampicillin 1000 mg ivpb mini bag, 1,000 mg, IntraVENous, 6 times per day, Al Hernandez MD    ferrous sulfate (IRON 325) tablet 325 mg, 325 mg, Oral, BID WC, Al Hernandez MD    Social History     Tobacco Use    Smoking status: Former Smoker     Types: Cigarettes    Smokeless tobacco: Never Used   Substance Use Topics    Alcohol use: Never       FAMILY MEDICAL HISTORY:   Negative for congenital abnormalities, autism, genetic disease and mental retardation, not listed above.      Review of Systems :   CONSTITUTIONAL : No fever, no chills   HEENT : No headache, no visual changes, no rhinorrhea, no sore throat   CARDIOVASCULAR : No pain, no palpitations, no edema   RESPIRATORY : No pain, no shortness of breath   GASTROINTESTINAL : No N/V, no D/C, no abdominal pain   GENITOURINARY : No dysuria, hematuria and no incontinence   MUSCULOSKELETAL : No myalgia, No back pain  NEUROLOGICAL : No numbness, no tingling, no tremors. No history of seizures  ALL OTHER SYSTEMS WERE REPORTED AS NEGATIVE. PERTINENT PHYSICAL EXAMINATION:   Patient Vitals for the past 24 hrs:   BP Temp Temp src Pulse Resp SpO2   11/25/21 1733 (!) 123/58 -- -- 78 16 99 %   11/25/21 1633 (!) 110/58 -- -- 70 16 99 %   11/25/21 1533 (!) 117/58 98 °F (36.7 °C) Oral 70 16 98 %   11/25/21 1434 (!) 105/55 -- -- 77 16 --   11/25/21 1333 (!) 116/58 -- -- 74 18 --   11/25/21 1230 119/65 97.9 °F (36.6 °C) Oral 73 18 98 %   11/25/21 1133 (!) 92/52 -- -- 70 16 97 %   11/25/21 1033 (!) 94/52 -- -- 69 16 97 %   11/25/21 0933 (!) 117/58 -- -- 69 16 97 %   11/25/21 0827 119/62 97.8 °F (36.6 °C) Oral 76 16 98 %   11/25/21 0739 116/74 -- -- 71 -- --   11/25/21 0639 (!) 113/55 -- -- 75 16 --   11/25/21 0539 (!) 107/59 -- -- 80 16 --   11/25/21 0439 (!) 100/53 -- -- 79 16 --   11/25/21 0339 (!) 113/54 -- -- 78 16 --   11/25/21 0239 (!) 98/54 -- -- 74 16 --   11/25/21 0139 (!) 109/55 97.6 °F (36.4 °C) Oral 69 16 --   11/25/21 0039 (!) 110/52 -- -- 78 16 --       GENERAL:   The patient is a well developed, female who is alert cooperative and oriented times three in no acute distress. HEENT:  Normo cephalic and atraumatic. No facial edema. ABDOMEN:   Her uterus is gravid. The patient complained of intermittent uterine contractions. The fetal heart rate is 125 bpm. The fetus was in the cephalic presentation which was confirmed by the ultrasound assessment. EXTREMITIES:  No peripheral edema is noted.      PELVIC EXAMINATION:  Not repeated secondary to mg/dL Final    Bilirubin Urine 11/24/2021 Negative  Negative Final    Ketones, Urine 11/24/2021 TRACE* Negative mg/dL Final    Specific [place], UA 11/24/2021 1.025  1.005 - 1.030 Final    Blood, Urine 11/24/2021 Negative  Negative Final    pH, UA 11/24/2021 6.5  5.0 - 9.0 Final    Protein, UA 11/24/2021 TRACE  Negative mg/dL Final    Urobilinogen, Urine 11/24/2021 2.0* <2.0 E.U./dL Final    Nitrite, Urine 11/24/2021 Negative  Negative Final    Leukocyte Esterase, Urine 11/24/2021 SMALL* Negative Final    Amphetamine Screen, Urine 11/24/2021 NOT DETECTED  Negative <1000 ng/mL Final    Barbiturate Screen, Ur 11/24/2021 NOT DETECTED  Negative < 200 ng/mL Final    Benzodiazepine Screen, Urine 11/24/2021 NOT DETECTED  Negative < 200 ng/mL Final    Cannabinoid Scrn, Ur 11/24/2021 NOT DETECTED  Negative < 50ng/mL Final    Cocaine Metabolite Screen, Urine 11/24/2021 NOT DETECTED  Negative < 300 ng/mL Final    Opiate Scrn, Ur 11/24/2021 NOT DETECTED  Negative < 300ng/mL Final    PCP Screen, Urine 11/24/2021 NOT DETECTED  Negative < 25 ng/mL Final    Methadone Screen, Urine 11/24/2021 NOT DETECTED  Negative <300 ng/mL Final    Oxycodone Urine 11/24/2021 NOT DETECTED  Negative <100 ng/mL Final    FENTANYL SCREEN, URINE 11/24/2021 NOT DETECTED  Negative <1 ng/mL Final    Drug Screen Comment: 11/24/2021 see below   Final    Fetal Fibronectin 11/24/2021 POSITIVE   Final    Amnisure, POC 11/24/2021 Positive  Negative Final    Lot Number 11/24/2021 [id number]   Final    Positive QC Pass/Fail 11/24/2021 Pass   Final    Negative QC Pass/Fail 11/24/2021 Pass   Final    ABO/Rh 11/24/2021 O NEG   Final    Antibody Screen 11/24/2021 NEG   Final    Sodium 11/24/2021 137  132 - 146 mmol/L Final    Potassium reflex Magnesium 11/24/2021 3.9  3.5 - 5.0 mmol/L Final    Chloride 11/24/2021 107  98 - 107 mmol/L Final    CO2 11/24/2021 19* 22 - 29 mmol/L Final    Anion Gap 11/24/2021 11  7 - 16 mmol/L Final    monitoring should be utilized for now. The patient should be monitored closely for evidence of chorioamnionitis. Further evaluation and management will be dependent on the results of the patient's testing and her clinical presentation. If you have any questions regarding her management, please contact me at your convenience and thank you for allowing me to participate in her care.     Sincerely,        Erin Bailey MD, MS, Sherman Gupta, JOHNCS, RDMS, RVT  Director [address] [place] [address]  [phone]

## 2021-11-25 NOTE — H&P
Department of Obstetrics and Gynecology  Attending Obstetrics History and Physical        CHIEF COMPLAINT:  leakage of amniotic fluid    HISTORY OF PRESENT ILLNESS:      The patient is a 19 y.o.  3 parity 2 at 33 weeks by dates. Patient presents with a chief complaint as above and is being admitted for ruptured membranes. Patient had spontaneous rupture of membranes today shortly before presentation to labor and delivery. Patient reports a large amount of fluid. Patient has not received any prenatal care during this pregnancy. Patient was considering either termination or adoption however she has not done anything about it as of yet. Patient has 2 prior normal vaginal deliveries she was cared for by Dr. Mary Jane Quesada with those pregnancies. The last delivery was barely 1 year ago. Dr. Mary Jane Quesada was contacted and declined care for the patient. Patient has not had any ultrasound any imaging or any dating of any sort during this pregnancy. Her last menstrual period puts her at 29 weeks and 4 days. Bedside ultrasound was performed which was limited however biparietal diameter measured right at 30 weeks gestation. Baby is in a vertex position with markedly decreased amniotic fluid JED was not measured large amount of cord was noted however no large pockets amniotic fluid was seen. Four-chamber heart was also noted. Otherwise the ultrasound was limited to just cephalic presentation and amniotic fluid assessment. OB History    Para Term  AB Living   3 2   2   2   SAB IAB Ectopic Molar Multiple Live Births           0 2      # Outcome Date GA Lbr Michael/2nd Weight Sex Delivery Anes PTL Lv   3 Current            2  10/11/20 36w4d 01:03 / 00:11 5 lb 10.3 oz (2.56 kg) F Vag-Spont None Y SUJATA   1  19 36w5d 10:45 / 00:14 6 lb 6 oz (2.892 kg) M Vag-Spont Local N SUJATA     Past Medical History:    No past medical history on file.   Past Surgical History:    No past surgical history on file. Social History:    TOBACCO:   reports that she has quit smoking. Her smoking use included cigarettes. She has never used smokeless tobacco.  ETOH:   reports no history of alcohol use. DRUGS:   reports no history of drug use. Family History:   No family history on file. Medications Prior to Admission:  No medications prior to admission. Allergies:  Patient has no known allergies. PHYSICAL EXAM:    General appearance:  awake, alert, cooperative, no apparent distress, and appears stated age  Neurologic: Normal deep tendon reflexes  Lungs:  No increased work of breathing, good air exchange, clear to auscultation bilaterally, no crackles or wheezing  Heart:  Normal apical impulse, regular rate and rhythm, normal S1 and S2, no S3 or S4, and no murmur noted  Abdomen: Gravid soft nontender no contractions are noted  Fetal heart rate:  Baseline Heart Rate 145, accelerations:  present    Cervix:    DILATION:  Closed  EFFACEMENT:   Long  STATION:  -3 cm    Cervical examination was performed prior to my contact by the RN    Contraction frequency:  0 minutes  Membranes:  Ruptured clear fluid    Pelvic Ultrasound: Vertex position decreased amniotic fluid index    ASSESSMENT AND PLAN:    The patient is a 19 y.o.  3 parity 2 at 34 weeks    1. No prenatal care  2.  29 weeks and 4 days by last menstrual period  3. Premature  rupture of membranes  4. Unknown group B strep status     Patient will be transferred to [facility]. I will assume care and management of the patient at that facility. Magnesium sulfate is being started for neuro protection will be run for 24 hours. Ampicillin 2 g and 1 g every 6 hours will be started for 48 hours. 1 g of a azithromycin was given and these were used for latency.  steroids will be administered as there is no contraindication.   Finally bedrest will be utilized along with Lovenox for DVT prophylaxis    Maternal-fetal medicine and  consultation will be obtained in the morning. Patient was informed of the need for hopefully prolonged hospitalization. There is no sign of labor at this time and no evidence of fetal compromise so the patient will be transferred by ambulance to [facility].

## 2021-11-26 ENCOUNTER — ANCILLARY PROCEDURE (OUTPATIENT)
Dept: OBGYN CLINIC | Age: 21
DRG: 566 | End: 2021-11-26
Payer: MEDICAID

## 2021-11-26 LAB — HEPATITIS B SURFACE ANTIGEN INTERPRETATION: NORMAL

## 2021-11-26 PROCEDURE — 6370000000 HC RX 637 (ALT 250 FOR IP): Performed by: OBSTETRICS & GYNECOLOGY

## 2021-11-26 PROCEDURE — 76819 FETAL BIOPHYS PROFIL W/O NST: CPT | Performed by: OBSTETRICS & GYNECOLOGY

## 2021-11-26 PROCEDURE — 76820 UMBILICAL ARTERY ECHO: CPT | Performed by: OBSTETRICS & GYNECOLOGY

## 2021-11-26 PROCEDURE — 6360000002 HC RX W HCPCS: Performed by: OBSTETRICS & GYNECOLOGY

## 2021-11-26 PROCEDURE — 1220000000 HC SEMI PRIVATE OB R&B

## 2021-11-26 PROCEDURE — 99231 SBSQ HOSP IP/OBS SF/LOW 25: CPT | Performed by: OBSTETRICS & GYNECOLOGY

## 2021-11-26 PROCEDURE — 2580000003 HC RX 258: Performed by: OBSTETRICS & GYNECOLOGY

## 2021-11-26 RX ADMIN — FERROUS SULFATE TAB 325 MG (65 MG ELEMENTAL FE) 325 MG: 325 (65 FE) TAB at 08:32

## 2021-11-26 RX ADMIN — AZITHROMYCIN 250 MG: 250 TABLET, FILM COATED ORAL at 08:32

## 2021-11-26 RX ADMIN — FAMOTIDINE 20 MG: 20 TABLET ORAL at 08:32

## 2021-11-26 RX ADMIN — AMPICILLIN SODIUM 1000 MG: 1 INJECTION, POWDER, FOR SOLUTION INTRAMUSCULAR; INTRAVENOUS at 16:46

## 2021-11-26 RX ADMIN — AMPICILLIN SODIUM 1000 MG: 1 INJECTION, POWDER, FOR SOLUTION INTRAMUSCULAR; INTRAVENOUS at 08:00

## 2021-11-26 RX ADMIN — FAMOTIDINE 20 MG: 20 TABLET ORAL at 22:06

## 2021-11-26 RX ADMIN — AMPICILLIN SODIUM 1000 MG: 1 INJECTION, POWDER, FOR SOLUTION INTRAMUSCULAR; INTRAVENOUS at 04:10

## 2021-11-26 RX ADMIN — AMPICILLIN SODIUM 1000 MG: 1 INJECTION, POWDER, FOR SOLUTION INTRAMUSCULAR; INTRAVENOUS at 12:32

## 2021-11-26 RX ADMIN — FERROUS SULFATE TAB 325 MG (65 MG ELEMENTAL FE) 325 MG: 325 (65 FE) TAB at 17:14

## 2021-11-26 RX ADMIN — AMPICILLIN SODIUM 1000 MG: 1 INJECTION, POWDER, FOR SOLUTION INTRAMUSCULAR; INTRAVENOUS at 20:14

## 2021-11-26 RX ADMIN — AMPICILLIN SODIUM 1000 MG: 1 INJECTION, POWDER, FOR SOLUTION INTRAMUSCULAR; INTRAVENOUS at 00:26

## 2021-11-26 RX ADMIN — ENOXAPARIN SODIUM 40 MG: 100 INJECTION SUBCUTANEOUS at 08:32

## 2021-11-26 ASSESSMENT — PAIN SCALES - GENERAL: PAINLEVEL_OUTOF10: 0

## 2021-11-26 NOTE — PROGRESS NOTES
Department of Obstetrics and Gynecology  Labor and Delivery   Attending Progress Note      SUBJECTIVE:  No complaints.      OBJECTIVE:      Vitals:    BP (!) 115/56   Pulse 71   Temp 97.9 °F (36.6 °C) (Oral)   Resp 15   LMP 2021   SpO2 98%     Fetal heart rate:       Baseline Heart Rate:  145        Accelerations:  present       Long Term Variability:  moderate       Decelerations:  absent         Contraction frequency: 0 minutes    Fetal Presentation:  Cephalic    Membranes:  Ruptured clear fluid    Cervix:  Not checked    ASSESSMENT & PLAN:    HD# 3 PPROM at 29 weeks  No prenatal care  S/P Magnesium sulfate for neuroprotection  S/P  steroids  Continue antibiotic for 5 days post IV    Observe for labor, chorio    Wilhemenia MD Anuja  2021

## 2021-11-26 NOTE — PROGRESS NOTES
RE: Efraín Mcgraw  : 2000   AGE: 19 y.o. This report has been created using voice recognition software. It may contain errors which are inherent in voice recognition technology. Dear Doctor:    Austin Cotton had a consultation today for the following indications:    Patient Active Problem List   Diagnosis    Multiparity    Premature rupture of membranes     premature rupture of membranes (PPROM) with onset of labor after 24 hours of rupture in third trimester, antepartum    No prenatal care in current pregnancy in third trimester    H/O  delivery, currently pregnant     Austin Cotton is a 19 y.o. female, who is G3(0,2,0,2). She has an Estimated Date of Delivery: 22 based on her LMP. She is currently 29 weeks 6 days gestation based on that assessment. The patient has had no prenatal care with her current pregnancy. The patient was transferred to [facility] secondary to  premature rupture of the amniotic membranes on 2021. The patient was considering termination of pregnancy or adoption. She had 2 prior  deliveries. The patient has had no fever or chills. She had no complaint of abdominal pain or tenderness. Her fetal movement has been good. The fetal heart rate tracing shows moderate variability with accelerations. Occasional variable decelerations are noted. The tracing is category 2. A fetal ultrasound evaluation was performed on 2021. A detailed report included in the EMR under the imaging tab from today's date. A living franco intrauterine fetus was identified in the cephalic presentation, with normal fetal heart motion and normal fetal motion noted. The amniotic fluid index was 2.3 cm consistent with oligohydramnios. Visualization of the fetal anatomy was limited secondary to the decreased amniotic fluid volume.   The biometric measurements were consistent with an estimated fetal weight of 2 pounds 11 ounces which places the fetus at the 29th growth percentile based on her estimated date of delivery established by her last menstrual period. The patient had no prior ultrasound evaluations during this pregnancy. Biometric measurements at this late gestational age are not reliable indicators of gestational age. The biophysical profile was scored 6 of 8, with 2 points deducted for the decreased amniotic fluid volume. The cord Doppler SD ratios were within normal limits for her gestational age. There was no evidence of absence, or reversal of end-diastolic flow. The amniotic fluid index on 2021 was 4.8 cm. The fetus was in the cephalic presentation. The placenta was anterior. The biophysical profile was scored 6 of 8 with 2 points deducted for the decreased amniotic fluid volume. The cord Doppler SD ratio was 3.07 which is within normal limits for the patient's gestational age. There was no absence, or reversal of end-diastolic flow.                  GENETIC SCREENING/TERATOLOGY COUNSELING              (Includes patient, FTB, and any affected family members)    Patient Age > 35 Years NO   Thalassemia ( MVC<80) NO   Congential Heart Defect NO   Neural Tube Defect NO   Armen-Sachs NO   Sickle Cell Disease NO   Sickle Cell Trait NO   Sickle C Disease or Trait NO   Hemophilia NO   Muscular Dystrophy NO   Cystic Fibrosis NO   Phelps Disease NO   Autism NO   Mental Retardation NO   History of Fragile X NO   Maternal Diabetes NO   Other Genetic Disease or Syndrome NO   Previous Child With Congenital Abnormality Not Listed NO   Recreational Drugs NO                                                                 INFECTION HISTORY     HEPATITIS IMMUNIZED:  NO   HEPATITIS INFECTION:  NO   EXPOSURE TO TB NO   PARVOVIRUS B-19 NO   CHICKEN POX  NO   MEASLES NO   HIV NO   OTHER RASH OR VIRAL ILLNESS SINCE LMP NO   UTI RECURRENT NO   HPV NO     OB History    Para Term  AB Living   3 2 2   2   SAB IAB Ectopic Molar Multiple Live Births           0 2      # Outcome Date GA Lbr Michael/2nd Weight Sex Delivery Anes PTL Lv   3 Current            2  10/11/20 36w4d 01:03 / 00:11 5 lb 10.3 oz (2.56 kg) F Vag-Spont None Y SUJATA   1  19 36w5d 10:45 / 00:14 6 lb 6 oz (2.892 kg) M Vag-Spont Local N SUJATA     PAST GYNECOLOGICAL  HISTORY:  Negative for abnormal pap smears. Negative for sexually transmitted diseases. Negative for cervical LEEP / conization /cryosurgery. Negative for uterine surgery. Negative for ovarian or tubal surgery. History reviewed. No pertinent past medical history. History reviewed. No pertinent surgical history.     No Known Allergies      Current Facility-Administered Medications:     acetaminophen (TYLENOL) tablet 650 mg, 650 mg, Oral, Q4H PRN, James Fox MD    ondansetron [facility]) injection 8 mg, 8 mg, IntraVENous, Q8H PRN, James Fox MD    famotidine (PEPCID) tablet 20 mg, 20 mg, Oral, BID, James Fox MD, 20 mg at 21 0832    enoxaparin (LOVENOX) injection 40 mg, 40 mg, SubCUTAneous, Daily, James Fox MD, 40 mg at 21 0832    [START ON 2021] amoxicillin (AMOXIL) capsule 250 mg, 250 mg, Oral, 3 times per day, Sebastian Villalba MD    [facility]) tablet 250 mg, 250 mg, Oral, Daily, Sebastian Villalba MD, 250 mg at 21 9902    lactated ringers infusion, , IntraVENous, Continuous, James Fox MD, Last Rate: 75 mL/hr at 21 1615, New Bag at 21 1615    ampicillin 1000 mg ivpb mini bag, 1,000 mg, IntraVENous, 6 times per day, Sebastian Villalba MD, Stopped at 21 1302    ferrous sulfate (IRON 325) tablet 325 mg, 325 mg, Oral, BID , Sebastian Villalba MD, 325 mg at 21 5935    Social History     Tobacco Use    Smoking status: Former Smoker     Types: Cigarettes    Smokeless tobacco: Never Used   Substance Use Topics    Alcohol use: Never       FAMILY MEDICAL HISTORY:   Negative for congenital abnormalities, autism, genetic disease and mental retardation, not listed above. Review of Systems :   CONSTITUTIONAL : No fever, no chills   HEENT : No headache, no visual changes, no rhinorrhea, no sore throat   CARDIOVASCULAR : No pain, no palpitations, no edema   RESPIRATORY : No pain, no shortness of breath   GASTROINTESTINAL : No N/V, no D/C, no abdominal pain   GENITOURINARY : No dysuria, hematuria and no incontinence   MUSCULOSKELETAL : No myalgia, No back pain  NEUROLOGICAL : No numbness, no tingling, no tremors. No history of seizures  ALL OTHER SYSTEMS WERE REPORTED AS NEGATIVE. PERTINENT PHYSICAL EXAMINATION:   Patient Vitals for the past 24 hrs:   BP Temp Temp src Pulse Resp SpO2   11/26/21 0839 (!) 115/56 97.9 °F (36.6 °C) Oral 71 15 --   11/26/21 0020 (!) 105/52 97.7 °F (36.5 °C) Oral 72 16 --   11/25/21 2133 (!) 118/58 -- -- 81 16 --   11/25/21 2033 (!) 111/58 -- -- 78 14 --   11/25/21 1933 125/60 97.9 °F (36.6 °C) Oral 95 16 --   11/25/21 1834 (!) 116/58 -- -- 79 16 98 %   11/25/21 1733 (!) 123/58 -- -- 78 16 99 %       GENERAL:   The patient is a well developed, female who is alert cooperative and oriented times three in no acute distress. HEENT:  Normo cephalic and atraumatic. No facial edema. ABDOMEN:   Her uterus is gravid. The patient complained of intermittent uterine contractions. The fetal heart rate is 135 bpm. The fetus was in the cephalic presentation which was confirmed by the ultrasound assessment. EXTREMITIES:  No peripheral edema is noted. PELVIC EXAMINATION:  Not repeated secondary to rupture of the amniotic membranes.     Admission on 11/25/2021   Component Date Value Ref Range Status    SARS-CoV-2, NAAT 11/25/2021 Not Detected  Not Detected Final    ABO/Rh 11/25/2021 O NEG   Final    Antibody Screen 11/25/2021 NEG   Final    RHIG LOT NUMBER 11/25/2021 RhImmuneGlobulin    [id number]          issued       1 vial 21 21:46   Final       IMPRESSION:    1.  IUP at 29 weeks 6 days gestation based on her Estimated Date of Delivery: 22    2. PPROM  2021    3. No prenatal care    4. Oligohydramnios 2021    5. Estimated fetal weight was at the 29th growth percentile 2021    6. Biophysical profile scored 6 of 8, with 2 points deducted for decreased fluid 2021    7. Reassuring cord Doppler SD ratio 2021    8. Cultures for GBS, chlamydia, gonorrhea, are pending    9. O negative blood type  10. Hypochromic microcytic anemia  11. Possible urinary tract infection. Culture pending. 12.  Two previous  deliveries  15. Category 2 fetal heart rate tracing as noted above  14. Celestone course completed 2021 at 2203 hrs. 15.  The patient received magnesium sulfate for fetal neuro protection 2021  16. RhoGam was administered 2021    PLAN:  The patient will be admitted for evaluation and management until delivery secondary to  premature rupture of the amniotic membranes. Latency antibiotics should be continued for a total of 7 days including the initial 48 hours of intravenous antimicrobial therapy, and 5 additional days of oral antibiotic therapy. No tocolytic medication should be administered secondary to  premature rupture of the amniotic membranes. DVT prophylaxis should be utilized throughout her admission. Continuous fetal heart rate and uterine contraction monitoring should be utilized for now. The patient should be monitored closely for evidence of chorioamnionitis. Further evaluation and management will be dependent on the results of the patient's testing and her clinical presentation. If you have any questions regarding her management, please contact me at your convenience and thank you for allowing me to participate in her care.     Sampson Saenz MD, MS, FACOG, FACS, RDCS, RDMS, RVT  Director Maternal-Fetal [address] [facility]  [phone]

## 2021-11-26 NOTE — PROGRESS NOTES
Patient sitting up in bed eating breakfast. Denies lof, vb, ctxs. States positive fm. Call light within reach.

## 2021-11-27 ENCOUNTER — ANCILLARY PROCEDURE (OUTPATIENT)
Dept: OBGYN CLINIC | Age: 21
DRG: 566 | End: 2021-11-27
Payer: MEDICAID

## 2021-11-27 LAB
GROUP B STREP CULTURE: ABNORMAL
ORGANISM: ABNORMAL

## 2021-11-27 PROCEDURE — 1220000000 HC SEMI PRIVATE OB R&B

## 2021-11-27 PROCEDURE — 99231 SBSQ HOSP IP/OBS SF/LOW 25: CPT | Performed by: OBSTETRICS & GYNECOLOGY

## 2021-11-27 PROCEDURE — 6370000000 HC RX 637 (ALT 250 FOR IP): Performed by: OBSTETRICS & GYNECOLOGY

## 2021-11-27 PROCEDURE — 76819 FETAL BIOPHYS PROFIL W/O NST: CPT | Performed by: OBSTETRICS & GYNECOLOGY

## 2021-11-27 PROCEDURE — 2580000003 HC RX 258: Performed by: OBSTETRICS & GYNECOLOGY

## 2021-11-27 PROCEDURE — 76820 UMBILICAL ARTERY ECHO: CPT | Performed by: OBSTETRICS & GYNECOLOGY

## 2021-11-27 PROCEDURE — 6360000002 HC RX W HCPCS: Performed by: OBSTETRICS & GYNECOLOGY

## 2021-11-27 RX ADMIN — FERROUS SULFATE TAB 325 MG (65 MG ELEMENTAL FE) 325 MG: 325 (65 FE) TAB at 18:55

## 2021-11-27 RX ADMIN — ACETAMINOPHEN 650 MG: 325 TABLET ORAL at 04:52

## 2021-11-27 RX ADMIN — AMOXICILLIN 250 MG: 250 CAPSULE ORAL at 13:57

## 2021-11-27 RX ADMIN — AMPICILLIN SODIUM 1000 MG: 1 INJECTION, POWDER, FOR SOLUTION INTRAMUSCULAR; INTRAVENOUS at 18:55

## 2021-11-27 RX ADMIN — FAMOTIDINE 20 MG: 20 TABLET ORAL at 09:00

## 2021-11-27 RX ADMIN — AMPICILLIN SODIUM 1000 MG: 1 INJECTION, POWDER, FOR SOLUTION INTRAMUSCULAR; INTRAVENOUS at 04:43

## 2021-11-27 RX ADMIN — AMPICILLIN SODIUM 1000 MG: 1 INJECTION, POWDER, FOR SOLUTION INTRAMUSCULAR; INTRAVENOUS at 00:06

## 2021-11-27 RX ADMIN — ACETAMINOPHEN 650 MG: 325 TABLET ORAL at 11:24

## 2021-11-27 RX ADMIN — AMPICILLIN SODIUM 1000 MG: 1 INJECTION, POWDER, FOR SOLUTION INTRAMUSCULAR; INTRAVENOUS at 13:56

## 2021-11-27 RX ADMIN — ENOXAPARIN SODIUM 40 MG: 100 INJECTION SUBCUTANEOUS at 09:00

## 2021-11-27 RX ADMIN — FAMOTIDINE 20 MG: 20 TABLET ORAL at 21:44

## 2021-11-27 RX ADMIN — FERROUS SULFATE TAB 325 MG (65 MG ELEMENTAL FE) 325 MG: 325 (65 FE) TAB at 09:00

## 2021-11-27 RX ADMIN — AMOXICILLIN 250 MG: 250 CAPSULE ORAL at 06:10

## 2021-11-27 RX ADMIN — AMPICILLIN SODIUM 1000 MG: 1 INJECTION, POWDER, FOR SOLUTION INTRAMUSCULAR; INTRAVENOUS at 08:00

## 2021-11-27 RX ADMIN — AMOXICILLIN 250 MG: 250 CAPSULE ORAL at 21:45

## 2021-11-27 RX ADMIN — AZITHROMYCIN 250 MG: 250 TABLET, FILM COATED ORAL at 09:00

## 2021-11-27 ASSESSMENT — PAIN SCALES - GENERAL
PAINLEVEL_OUTOF10: 10
PAINLEVEL_OUTOF10: 0
PAINLEVEL_OUTOF10: 4

## 2021-11-27 NOTE — PROGRESS NOTES
Patient resting comfortably in bed. States no current LOF. Denies any vb, pain. States +FM. No other complaints at this time.  Instructed to use call light if patient needs anything

## 2021-11-27 NOTE — PROGRESS NOTES
Assumed care of patient     +FM   Denies any bleeding, visual changes, headache, cramping or other pain  Patient C/O breast tenderness and engorgement  Warm compresses offered. Tylenol PRN will be given when due next     Call light within reach.  Resting comfortably in bed

## 2021-11-28 ENCOUNTER — ANCILLARY PROCEDURE (OUTPATIENT)
Dept: OBGYN CLINIC | Age: 21
DRG: 566 | End: 2021-11-28
Payer: MEDICAID

## 2021-11-28 LAB — RUBELLA IGM: <10 AU/ML

## 2021-11-28 PROCEDURE — 6370000000 HC RX 637 (ALT 250 FOR IP): Performed by: OBSTETRICS & GYNECOLOGY

## 2021-11-28 PROCEDURE — 99231 SBSQ HOSP IP/OBS SF/LOW 25: CPT | Performed by: OBSTETRICS & GYNECOLOGY

## 2021-11-28 PROCEDURE — 1220000000 HC SEMI PRIVATE OB R&B

## 2021-11-28 PROCEDURE — 6360000002 HC RX W HCPCS: Performed by: OBSTETRICS & GYNECOLOGY

## 2021-11-28 PROCEDURE — 76819 FETAL BIOPHYS PROFIL W/O NST: CPT | Performed by: OBSTETRICS & GYNECOLOGY

## 2021-11-28 PROCEDURE — 76820 UMBILICAL ARTERY ECHO: CPT | Performed by: OBSTETRICS & GYNECOLOGY

## 2021-11-28 PROCEDURE — 2580000003 HC RX 258: Performed by: OBSTETRICS & GYNECOLOGY

## 2021-11-28 RX ADMIN — FAMOTIDINE 20 MG: 20 TABLET ORAL at 08:06

## 2021-11-28 RX ADMIN — ENOXAPARIN SODIUM 40 MG: 100 INJECTION SUBCUTANEOUS at 08:06

## 2021-11-28 RX ADMIN — FERROUS SULFATE TAB 325 MG (65 MG ELEMENTAL FE) 325 MG: 325 (65 FE) TAB at 17:26

## 2021-11-28 RX ADMIN — AZITHROMYCIN 250 MG: 250 TABLET, FILM COATED ORAL at 08:06

## 2021-11-28 RX ADMIN — AMOXICILLIN 250 MG: 250 CAPSULE ORAL at 06:27

## 2021-11-28 RX ADMIN — FAMOTIDINE 20 MG: 20 TABLET ORAL at 21:31

## 2021-11-28 RX ADMIN — AMOXICILLIN 250 MG: 250 CAPSULE ORAL at 21:31

## 2021-11-28 RX ADMIN — FERROUS SULFATE TAB 325 MG (65 MG ELEMENTAL FE) 325 MG: 325 (65 FE) TAB at 08:05

## 2021-11-28 RX ADMIN — AMOXICILLIN 250 MG: 250 CAPSULE ORAL at 15:35

## 2021-11-28 RX ADMIN — SODIUM CHLORIDE, POTASSIUM CHLORIDE, SODIUM LACTATE AND CALCIUM CHLORIDE: 600; 310; 30; 20 INJECTION, SOLUTION INTRAVENOUS at 06:36

## 2021-11-28 ASSESSMENT — PAIN SCALES - GENERAL: PAINLEVEL_OUTOF10: 0

## 2021-11-28 NOTE — PROGRESS NOTES
Patient Active Problem List   Diagnosis    Multiparity    Premature rupture of membranes     premature rupture of membranes (PPROM) with onset of labor after 24 hours of rupture in third trimester, antepartum    No prenatal care in current pregnancy in third trimester    H/O  delivery, currently pregnant     Prasanna Candelaria is a 19 y.o. female, who is G3(0,2,0,2). She has an Estimated Date of Delivery: 22 based on her LMP. She is currently 30 weeks 0 days gestation based on that assessment. The patient has had no prenatal care with her current pregnancy. The patient was transferred to [facility] secondary to  premature rupture of the amniotic membranes on 2021. The patient was considering termination of pregnancy or adoption. She had 2 prior  deliveries. The patient has had no fever or chills. She had no complaint of abdominal pain or tenderness. Her fetal movement has been good. The fetal heart rate tracing shows moderate variability with accelerations. Occasional variable decelerations are noted. The tracing is category 2. Long areas of discontinuity are noted in the tracing secondary to maternal and fetal movement. A fetal ultrasound evaluation was performed on 2021. A detailed report included in the EMR under the imaging tab from today's date. A living franco intrauterine fetus was identified in the cephalic presentation, with normal fetal heart motion and normal fetal motion noted. The amniotic fluid index was 2.3 cm consistent with oligohydramnios. Visualization of the fetal anatomy was limited secondary to the decreased amniotic fluid volume. The biometric measurements were consistent with an estimated fetal weight of 2 pounds 11 ounces which places the fetus at the 29th growth percentile based on her estimated date of delivery established by her last menstrual period.   The patient had no prior ultrasound evaluations during this pregnancy. Biometric measurements at this late gestational age are not reliable indicators of gestational age. The biophysical profile was scored 6 of 8, with 2 points deducted for the decreased amniotic fluid volume. The cord Doppler SD ratios were within normal limits for her gestational age. There was no evidence of absence, or reversal of end-diastolic flow. The amniotic fluid index on 2021 was 2.3 cm. The fetus was in the cephalic presentation. The placenta was anterior. The biophysical profile was scored 6 of 8 with 2 points deducted for the decreased amniotic fluid volume. The cord Doppler SD ratio was 2.36, which is within normal limits for the patient's gestational age. There was no absence, or reversal of end-diastolic flow.                  GENETIC SCREENING/TERATOLOGY COUNSELING              (Includes patient, FTB, and any affected family members)    Patient Age > 35 Years NO   Thalassemia ( MVC<80) NO   Congential Heart Defect NO   Neural Tube Defect NO   Armen-Sachs NO   Sickle Cell Disease NO   Sickle Cell Trait NO   Sickle C Disease or Trait NO   Hemophilia NO   Muscular Dystrophy NO   Cystic Fibrosis NO   Evergreen Disease NO   Autism NO   Mental Retardation NO   History of Fragile X NO   Maternal Diabetes NO   Other Genetic Disease or Syndrome NO   Previous Child With Congenital Abnormality Not Listed NO   Recreational Drugs NO                                                                 INFECTION HISTORY     HEPATITIS IMMUNIZED:  NO   HEPATITIS INFECTION:  NO   EXPOSURE TO TB NO   PARVOVIRUS B-19 NO   CHICKEN POX  NO   MEASLES NO   HIV NO   OTHER RASH OR VIRAL ILLNESS SINCE LMP NO   UTI RECURRENT NO   HPV NO     OB History    Para Term  AB Living   3 2   2   2   SAB IAB Ectopic Molar Multiple Live Births           0 2      # Outcome Date GA Lbr Michael/2nd Weight Sex Delivery Anes PTL Lv   3 Current            2  10/11/20 36w4d 01:03 / 00:11 5 lb 10.3 oz (2.56 kg) F Vag-Spont None Y SUJATA   1  19 36w5d 10:45 / 00:14 6 lb 6 oz (2.892 kg) M Vag-Spont Local N SUJATA     PAST GYNECOLOGICAL  HISTORY:  Negative for abnormal pap smears. Negative for sexually transmitted diseases. Negative for cervical LEEP / conization /cryosurgery. Negative for uterine surgery. Negative for ovarian or tubal surgery. History reviewed. No pertinent past medical history. History reviewed. No pertinent surgical history. No Known Allergies      Current Facility-Administered Medications:     acetaminophen (TYLENOL) tablet 650 mg, 650 mg, Oral, Q4H PRN, Celi Valentine MD, 650 mg at 21 1124    ondansetron (ZOFRAN) injection 8 mg, 8 mg, IntraVENous, Q8H PRN, Celi Valentine MD    famotidine (PEPCID) tablet 20 mg, 20 mg, Oral, BID, Celi Valentine MD, 20 mg at 21 0900    enoxaparin (LOVENOX) injection 40 mg, 40 mg, SubCUTAneous, Daily, Celi Valentine MD, 40 mg at 21 0900    amoxicillin (AMOXIL) capsule 250 mg, 250 mg, Oral, 3 times per day, Zeferino Cruz MD, 250 mg at 21 1357    azithromycin (ZITHROMAX) tablet 250 mg, 250 mg, Oral, Daily, Zeferino Cruz MD, 250 mg at 21 0900    lactated ringers infusion, , IntraVENous, Continuous, Celi Valentine MD, Last Rate: 75 mL/hr at 21 1615, New Bag at 21 1615    ferrous sulfate (IRON 325) tablet 325 mg, 325 mg, Oral, BID , Zeferino Cruz MD, 325 mg at 21 8355    Social History     Tobacco Use    Smoking status: Former Smoker     Types: Cigarettes    Smokeless tobacco: Never Used   Substance Use Topics    Alcohol use: Never       FAMILY MEDICAL HISTORY:   Negative for congenital abnormalities, autism, genetic disease and mental retardation, not listed above.      Review of Systems :   CONSTITUTIONAL : No fever, no chills   HEENT : No headache, no visual changes, no rhinorrhea, no sore throat   CARDIOVASCULAR : No decreased fluid 2021    7. Reassuring cord Doppler SD ratio 2021    8. Cultures for GBS, chlamydia, gonorrhea, are pending    9. O negative blood type  10. Hypochromic microcytic anemia  11. Possible urinary tract infection. Culture pending. 12.  Two previous  deliveries  15. Category 2 fetal heart rate tracing as noted above  14. Celestone course completed 2021 at 2203 hrs. 15.  The patient received magnesium sulfate for fetal neuro protection 2021  16. RhoGam was administered 2021    PLAN:  The patient will be admitted for evaluation and management until delivery secondary to  premature rupture of the amniotic membranes. Latency antibiotics should be continued for a total of 7 days including the initial 48 hours of intravenous antimicrobial therapy, and 5 additional days of oral antibiotic therapy. No tocolytic medication should be administered secondary to  premature rupture of the amniotic membranes. DVT prophylaxis should be utilized throughout her admission. Continuous fetal heart rate and uterine contraction monitoring should be utilized for now. The patient should be monitored closely for evidence of chorioamnionitis. Further evaluation and management will be dependent on the results of the patient's testing and her clinical presentation. If you have any questions regarding her management, please contact me at your convenience and thank you for allowing me to participate in her care.     Bora Light MD, MS, Eric Farmer, GAVIN, RDMS, RVT  Director [address] [place], [place] [phone]

## 2021-11-28 NOTE — PROGRESS NOTES
Patient Active Problem List   Diagnosis    Multiparity    Premature rupture of membranes     premature rupture of membranes (PPROM) with onset of labor after 24 hours of rupture in third trimester, antepartum    No prenatal care in current pregnancy in third trimester    H/O  delivery, currently pregnant     Aleksandra Aguiar is a 19 y.o. female, who is G3(0,2,0,2). She has an Estimated Date of Delivery: 22 based on her LMP. She is currently 30 weeks 1 days gestation based on that assessment. The patient has had no prenatal care with her current pregnancy. The patient was transferred to [facility] secondary to  premature rupture of the amniotic membranes on 2021. The patient was considering termination of pregnancy or adoption. She had 2 prior  deliveries. The patient has had no fever or chills. She had no complaint of abdominal pain or tenderness. Her fetal movement has been good. The fetal heart rate tracing shows moderate variability with accelerations. Occasional variable decelerations are noted. The tracing is category 2. Prolonged areas of discontinuity are noted in the tracing secondary to maternal and fetal movement. A fetal ultrasound evaluation was performed on 2021. A detailed report included in the EMR under the imaging tab from today's date. A living franco intrauterine fetus was identified in the cephalic presentation, with normal fetal heart motion and normal fetal motion noted. The amniotic fluid index was 2.3 cm consistent with oligohydramnios. Visualization of the fetal anatomy was limited secondary to the decreased amniotic fluid volume. The biometric measurements were consistent with an estimated fetal weight of 2 pounds 11 ounces which places the fetus at the 29th growth percentile based on her estimated date of delivery established by her last menstrual period.   The patient had no prior ultrasound evaluations during this pregnancy. Biometric measurements at this late gestational age are not reliable indicators of gestational age. The biophysical profile was scored 6 of 8, with 2 points deducted for the decreased amniotic fluid volume. The cord Doppler SD ratios were within normal limits for her gestational age. There was no evidence of absence, or reversal of end-diastolic flow. The amniotic fluid index on 2021 was 3.8 cm. The fetus was in the cephalic presentation. The placenta was anterior. The biophysical profile was scored 6 of 8 with 2 points deducted for the decreased amniotic fluid volume. The cord Doppler SD ratio was 2.49, which is within normal limits for the patient's gestational age. There was no absence, or reversal of end-diastolic flow.                  GENETIC SCREENING/TERATOLOGY COUNSELING              (Includes patient, FTB, and any affected family members)    Patient Age > 35 Years NO   Thalassemia ( MVC<80) NO   Congential Heart Defect NO   Neural Tube Defect NO   Armen-Sachs NO   Sickle Cell Disease NO   Sickle Cell Trait NO   Sickle C Disease or Trait NO   Hemophilia NO   Muscular Dystrophy NO   Cystic Fibrosis NO   Braxton Disease NO   Autism NO   Mental Retardation NO   History of Fragile X NO   Maternal Diabetes NO   Other Genetic Disease or Syndrome NO   Previous Child With Congenital Abnormality Not Listed NO   Recreational Drugs NO                                                                 INFECTION HISTORY     HEPATITIS IMMUNIZED:  NO   HEPATITIS INFECTION:  NO   EXPOSURE TO TB NO   PARVOVIRUS B-19 NO   CHICKEN POX  NO   MEASLES NO   HIV NO   OTHER RASH OR VIRAL ILLNESS SINCE LMP NO   UTI RECURRENT NO   HPV NO     OB History    Para Term  AB Living   3 2   2   2   SAB IAB Ectopic Molar Multiple Live Births           0 2      # Outcome Date GA Lbr Michael/2nd Weight Sex Delivery Anes PTL Lv   3 Current            2  10/11/20 36w4d 01:03 / 00:11 5 lb 10.3 oz (2.56 kg) F Vag-Spont None Y SUJATA   1  19 36w5d 10:45 / 00:14 6 lb 6 oz (2.892 kg) M Vag-Spont Local N SUJATA     PAST GYNECOLOGICAL  HISTORY:  Negative for abnormal pap smears. Negative for sexually transmitted diseases. Negative for cervical LEEP / conization /cryosurgery. Negative for uterine surgery. Negative for ovarian or tubal surgery. History reviewed. No pertinent past medical history. History reviewed. No pertinent surgical history. No Known Allergies      Current Facility-Administered Medications:     acetaminophen (TYLENOL) tablet 650 mg, 650 mg, Oral, Q4H PRN, Dorita Sams MD, 650 mg at 21 1124    ondansetron (ZOFRAN) injection 8 mg, 8 mg, IntraVENous, Q8H PRN, Dorita Sams MD    famotidine (PEPCID) tablet 20 mg, 20 mg, Oral, BID, Dorita Sams MD, 20 mg at 21 0806    enoxaparin (LOVENOX) injection 40 mg, 40 mg, SubCUTAneous, Daily, Dorita Sams MD, 40 mg at 21 0806    amoxicillin (AMOXIL) capsule 250 mg, 250 mg, Oral, 3 times per day, Robert Conde MD, 250 mg at 21 2780    azithromycin (ZITHROMAX) tablet 250 mg, 250 mg, Oral, Daily, Robert Conde MD, 250 mg at 21 1242    lactated ringers infusion, , IntraVENous, Continuous, Dorita Sams MD, Last Rate: 75 mL/hr at 21 0636, New Bag at 21 0636    ferrous sulfate (IRON 325) tablet 325 mg, 325 mg, Oral, BID WC, Robert Conde MD, 325 mg at 21 0805    Social History     Tobacco Use    Smoking status: Former Smoker     Types: Cigarettes    Smokeless tobacco: Never Used   Substance Use Topics    Alcohol use: Never       FAMILY MEDICAL HISTORY:   Negative for congenital abnormalities, autism, genetic disease and mental retardation, not listed above.      Review of Systems :   CONSTITUTIONAL : No fever, no chills   HEENT : No headache, no visual changes, no rhinorrhea, no sore throat   CARDIOVASCULAR : No pain, no palpitations, no edema   RESPIRATORY : No pain, no shortness of breath   GASTROINTESTINAL : No N/V, no D/C, no abdominal pain   GENITOURINARY : No dysuria, hematuria and no incontinence   MUSCULOSKELETAL : No myalgia, No back pain  NEUROLOGICAL : No numbness, no tingling, no tremors. No history of seizures  ALL OTHER SYSTEMS WERE REPORTED AS NEGATIVE. PERTINENT PHYSICAL EXAMINATION:   Patient Vitals for the past 24 hrs:   BP Temp Temp src Pulse Resp   11/28/21 0809 132/70 98.1 °F (36.7 °C) Oral 65 12   11/28/21 0105 (!) 128/57 98.2 °F (36.8 °C) Oral 64 16   11/27/21 2100 -- 98.2 °F (36.8 °C) Oral -- --   11/27/21 1933 137/62 98 °F (36.7 °C) Oral 63 16   11/27/21 1400 (!) 122/56 -- -- 68 --       GENERAL:   The patient is a well developed, female who is alert cooperative and oriented times three in no acute distress. HEENT:  Normo cephalic and atraumatic. No facial edema. ABDOMEN:   Her uterus is gravid. The patient complained of intermittent uterine contractions. The fetal heart rate is 136 bpm. The fetus was in the cephalic presentation which was confirmed by the ultrasound assessment. EXTREMITIES:  No peripheral edema is noted. PELVIC EXAMINATION:  Not repeated secondary to rupture of the amniotic membranes. Admission on 11/25/2021   Component Date Value Ref Range Status    SARS-CoV-2, NAAT 11/25/2021 Not Detected  Not Detected Final    ABO/Rh 11/25/2021 O NEG   Final    Antibody Screen 11/25/2021 NEG   Final    RHIG LOT NUMBER 11/25/2021 RhImmuneGlobulin    [id number]          issued       1 vial  11/25/21 21:46   Final     IMPRESSION:    1.  IUP at 30 weeks 1 days gestation based on her Estimated Date of Delivery: 2/5/22    2. PPROM  11/24/2021    3. No prenatal care    4. Oligohydramnios 11/28/2021    5. Estimated fetal weight was at the 29th growth percentile 11/25/2021    6. Biophysical profile scored 6 of 8, with 2 points deducted for decreased fluid 11/28/2021    7. Reassuring cord Doppler SD ratio 2021    8. Cultures for GBS, chlamydia, gonorrhea, are pending    9. O negative blood type  10. Hypochromic microcytic anemia  11. Possible urinary tract infection. Culture pending. 12.  Two previous  deliveries  15. Category 2 fetal heart rate tracing as noted above  14. Celestone course completed 2021 at 2203 hrs. 15.  The patient received magnesium sulfate for fetal neuro protection 2021  16. RhoGam was administered 2021    PLAN:  The patient will be admitted for evaluation and management until delivery secondary to  premature rupture of the amniotic membranes. Latency antibiotics should be continued for a total of 7 days including the initial 48 hours of intravenous antimicrobial therapy, and 5 additional days of oral antibiotic therapy. No tocolytic medication should be administered secondary to  premature rupture of the amniotic membranes. DVT prophylaxis should be utilized throughout her admission. Fetal heart rate monitoring may be performed 1 hour after breakfast lunch and dinner, unless there is indication for continuous fetal heart rate monitoring. The patient should be monitored closely for evidence of chorioamnionitis. Further evaluation and management will be dependent on the results of the patient's testing and her clinical presentation. If you have any questions regarding her management, please contact me at your convenience and thank you for allowing me to participate in her care.     Aakash Ayala MD, MS, Laurence Shipley, GAVIN, RDMS, RVT  Director [address] [place] [address]  [phone]

## 2021-11-28 NOTE — PROGRESS NOTES
Assumed care of pt. Pt denies any vaginal bleeding, states she is still leaking some fluid. Reports no ctx, good FM. Denies any pain. EFM adjusted, call light within reach.

## 2021-11-28 NOTE — PROGRESS NOTES
[id number]: pt laying on side and states she is not willing to move at this time, unable to trace baby.  States she will call when she is ready to move

## 2021-11-29 ENCOUNTER — ANCILLARY PROCEDURE (OUTPATIENT)
Dept: OBGYN CLINIC | Age: 21
DRG: 566 | End: 2021-11-29
Payer: MEDICAID

## 2021-11-29 PROCEDURE — 99231 SBSQ HOSP IP/OBS SF/LOW 25: CPT | Performed by: OBSTETRICS & GYNECOLOGY

## 2021-11-29 PROCEDURE — 2580000003 HC RX 258: Performed by: OBSTETRICS & GYNECOLOGY

## 2021-11-29 PROCEDURE — 1220000000 HC SEMI PRIVATE OB R&B

## 2021-11-29 PROCEDURE — 6370000000 HC RX 637 (ALT 250 FOR IP): Performed by: OBSTETRICS & GYNECOLOGY

## 2021-11-29 PROCEDURE — 6360000002 HC RX W HCPCS: Performed by: OBSTETRICS & GYNECOLOGY

## 2021-11-29 PROCEDURE — 76819 FETAL BIOPHYS PROFIL W/O NST: CPT | Performed by: OBSTETRICS & GYNECOLOGY

## 2021-11-29 PROCEDURE — 76820 UMBILICAL ARTERY ECHO: CPT | Performed by: OBSTETRICS & GYNECOLOGY

## 2021-11-29 RX ADMIN — AMOXICILLIN 250 MG: 250 CAPSULE ORAL at 21:32

## 2021-11-29 RX ADMIN — SODIUM CHLORIDE, POTASSIUM CHLORIDE, SODIUM LACTATE AND CALCIUM CHLORIDE: 600; 310; 30; 20 INJECTION, SOLUTION INTRAVENOUS at 05:44

## 2021-11-29 RX ADMIN — AMOXICILLIN 250 MG: 250 CAPSULE ORAL at 05:43

## 2021-11-29 RX ADMIN — FAMOTIDINE 20 MG: 20 TABLET ORAL at 21:32

## 2021-11-29 RX ADMIN — FAMOTIDINE 20 MG: 20 TABLET ORAL at 10:02

## 2021-11-29 RX ADMIN — AZITHROMYCIN 250 MG: 250 TABLET, FILM COATED ORAL at 10:02

## 2021-11-29 RX ADMIN — ENOXAPARIN SODIUM 40 MG: 100 INJECTION SUBCUTANEOUS at 10:02

## 2021-11-29 ASSESSMENT — PAIN SCALES - GENERAL: PAINLEVEL_OUTOF10: 0

## 2021-11-29 NOTE — PROGRESS NOTES
Department of Obstetrics and Gynecology  Labor and Delivery   Attending Progress Note      SUBJECTIVE:  No new complaints     OBJECTIVE:      Vitals:    BP (!) 118/59   Pulse 78   Temp 98 °F (36.7 °C) (Oral)   Resp 16   LMP 2021   SpO2 98%     Fetal heart rate:       Baseline Heart Rate:  145        Accelerations:  present       Long Term Variability:  moderate               Contraction frequency: 0 minutes    Fetal Presentation:  Cephalic    Membranes:  Ruptured clear fluid  2021    ASSESSMENT & PLAN:    HD#5 PPROM at 29 weeks EGA  Currently 30.1 weeks EGA  S/P magnesium sulfate  S/P  steroids  Cont ATBx - GBS positive   Cont DVT prophylaxis    Supriya Kilpatrick MD  2021

## 2021-11-29 NOTE — PROGRESS NOTES
Patient Active Problem List   Diagnosis    Multiparity    Premature rupture of membranes     premature rupture of membranes (PPROM) with onset of labor after 24 hours of rupture in third trimester, antepartum    No prenatal care in current pregnancy in third trimester    H/O  delivery, currently pregnant     Pilo Romero is a 19 y.o. female, who is G3(0,2,0,2). She has an Estimated Date of Delivery: 22 based on her LMP. She is currently 30 weeks 2 days gestation based on that assessment. The patient has had no prenatal care with her current pregnancy. The patient was transferred to [facility] secondary to  premature rupture of the amniotic membranes on 2021. The patient was considering termination of pregnancy or adoption. She had 2 prior  deliveries. The patient has had no fever or chills. She had no complaint of abdominal pain or tenderness. Her fetal movement has been good. The fetal heart rate tracing shows moderate variability with accelerations. Occasional variable decelerations are noted. The tracing is category 2. Prolonged areas of discontinuity are noted in the tracing secondary to maternal and fetal movement. A fetal ultrasound evaluation was performed on 2021. A detailed report included in the EMR under the imaging tab from today's date. A living franco intrauterine fetus was identified in the cephalic presentation, with normal fetal heart motion and normal fetal motion noted. The amniotic fluid index was 2.3 cm consistent with oligohydramnios. Visualization of the fetal anatomy was limited secondary to the decreased amniotic fluid volume. The biometric measurements were consistent with an estimated fetal weight of 2 pounds 11 ounces which places the fetus at the 29th growth percentile based on her estimated date of delivery established by her last menstrual period.   The patient had no prior ultrasound evaluations during this pregnancy. Biometric measurements at this late gestational age are not reliable indicators of gestational age. The biophysical profile was scored 6 of 8, with 2 points deducted for the decreased amniotic fluid volume. The cord Doppler SD ratios were within normal limits for her gestational age. There was no evidence of absence, or reversal of end-diastolic flow. The amniotic fluid index on 2021 was 4.6 cm. The fetus was in the cephalic presentation. The placenta was anterior. The biophysical profile was scored 6 of 8 with 2 points deducted for the decreased amniotic fluid volume. The cord Doppler SD ratio was 2.82, which is within normal limits for the patient's gestational age. There was no absence, or reversal of end-diastolic flow.                  GENETIC SCREENING/TERATOLOGY COUNSELING              (Includes patient, FTB, and any affected family members)    Patient Age > 35 Years NO   Thalassemia ( MVC<80) NO   Congential Heart Defect NO   Neural Tube Defect NO   Armen-Sachs NO   Sickle Cell Disease NO   Sickle Cell Trait NO   Sickle C Disease or Trait NO   Hemophilia NO   Muscular Dystrophy NO   Cystic Fibrosis NO   Baudette Disease NO   Autism NO   Mental Retardation NO   History of Fragile X NO   Maternal Diabetes NO   Other Genetic Disease or Syndrome NO   Previous Child With Congenital Abnormality Not Listed NO   Recreational Drugs NO                                                                 INFECTION HISTORY     HEPATITIS IMMUNIZED:  NO   HEPATITIS INFECTION:  NO   EXPOSURE TO TB NO   PARVOVIRUS B-19 NO   CHICKEN POX  NO   MEASLES NO   HIV NO   OTHER RASH OR VIRAL ILLNESS SINCE LMP NO   UTI RECURRENT NO   HPV NO     OB History    Para Term  AB Living   3 2   2   2   SAB IAB Ectopic Molar Multiple Live Births           0 2      # Outcome Date GA Lbr Michael/2nd Weight Sex Delivery Anes PTL Lv   3 Current            2  10/11/20 36w4d no abdominal pain   GENITOURINARY : No dysuria, hematuria and no incontinence   MUSCULOSKELETAL : No myalgia, No back pain  NEUROLOGICAL : No numbness, no tingling, no tremors. No history of seizures  ALL OTHER SYSTEMS WERE REPORTED AS NEGATIVE. PERTINENT PHYSICAL EXAMINATION:   Patient Vitals for the past 24 hrs:   BP Temp Temp src Pulse Resp   11/29/21 1530 137/65 98 °F (36.7 °C) Oral 73 18   11/29/21 0945 137/60 98.5 °F (36.9 °C) Oral 79 18   11/29/21 0150 (!) 127/58 98.2 °F (36.8 °C) Oral 60 16   11/28/21 1942 -- -- -- 78 --   11/28/21 1940 (!) 118/59 98 °F (36.7 °C) Oral 78 16       GENERAL:   The patient is a well developed, female who is alert cooperative and oriented times three in no acute distress. HEENT:  Normo cephalic and atraumatic. No facial edema. ABDOMEN:   Her uterus is gravid. The patient complained of intermittent uterine contractions. The fetal heart rate is 142 bpm. The fetus was in the cephalic presentation which was confirmed by the ultrasound assessment. EXTREMITIES:  No peripheral edema is noted. PELVIC EXAMINATION:  Not repeated secondary to rupture of the amniotic membranes. Admission on 11/25/2021   Component Date Value Ref Range Status    SARS-CoV-2, NAAT 11/25/2021 Not Detected  Not Detected Final    ABO/Rh 11/25/2021 O NEG   Final    Antibody Screen 11/25/2021 NEG   Final    RHIG LOT NUMBER 11/25/2021 RhImmuneGlobulin    [id number]          issued       1 vial  11/25/21 21:46   Final     IMPRESSION:    1.  IUP at 30 weeks 2 days gestation based on her Estimated Date of Delivery: 2/5/22    2. PPROM  11/24/2021    3. No prenatal care    4. Oligohydramnios 11/29/2021    5. Estimated fetal weight was at the 29th growth percentile 11/25/2021    6. Biophysical profile scored 6 of 8, with 2 points deducted for decreased fluid 11/29/2021    7. Reassuring cord Doppler SD ratio 11/29/2021    8. Cultures for GBS, chlamydia, gonorrhea, are pending    9.   O negative blood type  10. Hypochromic microcytic anemia  11. Possible urinary tract infection. Culture pending. 12.  Two previous  deliveries  15. Category 2 fetal heart rate tracing as noted above  14. Celestone course completed 2021 at 2203 hrs. 15.  The patient received magnesium sulfate for fetal neuro protection 2021  16. RhoGam was administered 2021    PLAN:  The patient will be admitted for evaluation and management until delivery secondary to  premature rupture of the amniotic membranes. Latency antibiotics should be continued for a total of 7 days including the initial 48 hours of intravenous antimicrobial therapy, and 5 additional days of oral antibiotic therapy. No tocolytic medication should be administered secondary to  premature rupture of the amniotic membranes. DVT prophylaxis should be utilized throughout her admission. Fetal heart rate monitoring may be performed 1 hour after breakfast lunch and dinner, unless there is indication for continuous fetal heart rate monitoring. The patient should be monitored closely for evidence of chorioamnionitis. Further evaluation and management will be dependent on the results of the patient's testing and her clinical presentation. If you have any questions regarding her management, please contact me at your convenience and thank you for allowing me to participate in her care.     Erin Bailey MD, MS, Sherman Gupta RDCS, RDMS, RVT  Director [address] [place] [address]  [phone]

## 2021-11-30 ENCOUNTER — ANCILLARY PROCEDURE (OUTPATIENT)
Dept: OBGYN CLINIC | Age: 21
DRG: 566 | End: 2021-11-30
Payer: MEDICAID

## 2021-11-30 PROCEDURE — 99231 SBSQ HOSP IP/OBS SF/LOW 25: CPT | Performed by: OBSTETRICS & GYNECOLOGY

## 2021-11-30 PROCEDURE — 76819 FETAL BIOPHYS PROFIL W/O NST: CPT | Performed by: OBSTETRICS & GYNECOLOGY

## 2021-11-30 PROCEDURE — 6370000000 HC RX 637 (ALT 250 FOR IP): Performed by: OBSTETRICS & GYNECOLOGY

## 2021-11-30 PROCEDURE — 6360000002 HC RX W HCPCS: Performed by: OBSTETRICS & GYNECOLOGY

## 2021-11-30 PROCEDURE — 76820 UMBILICAL ARTERY ECHO: CPT | Performed by: OBSTETRICS & GYNECOLOGY

## 2021-11-30 PROCEDURE — 1220000000 HC SEMI PRIVATE OB R&B

## 2021-11-30 RX ADMIN — AZITHROMYCIN 250 MG: 250 TABLET, FILM COATED ORAL at 09:08

## 2021-11-30 RX ADMIN — ENOXAPARIN SODIUM 40 MG: 100 INJECTION SUBCUTANEOUS at 09:08

## 2021-11-30 RX ADMIN — AMOXICILLIN 250 MG: 250 CAPSULE ORAL at 06:14

## 2021-11-30 RX ADMIN — FAMOTIDINE 20 MG: 20 TABLET ORAL at 09:08

## 2021-11-30 RX ADMIN — AMOXICILLIN 250 MG: 250 CAPSULE ORAL at 21:59

## 2021-11-30 RX ADMIN — FAMOTIDINE 20 MG: 20 TABLET ORAL at 21:59

## 2021-11-30 NOTE — PROGRESS NOTES
Department of Obstetrics and Gynecology  Labor and Delivery   Attending Progress Note      SUBJECTIVE:  No complaints    OBJECTIVE:      Vitals:    /65   Pulse 73   Temp 98 °F (36.7 °C) (Oral)   Resp 18   LMP 05/01/2021   SpO2 98%     Fetal heart rate:       Baseline Heart Rate:  145        Accelerations:  present       Long Term Variability:  moderate         Contraction frequency: 0 minutes    Fetal Presentation:  Cephalic    Membranes:  Ruptured clear fluid      ASSESSMENT & PLAN:    HD# 6 PPROM at 29 weeks EGA  Currently 30.2 weeks EGA    Cont ATBx - GBS positive  Cont DVT prophylaxis    Sherine Lyons MD  11/29/2021

## 2021-11-30 NOTE — PROGRESS NOTES
Pt stating cramping has decreased. Pt able to fall asleep. Water pitcher refilled for pt. PO fluids encouraged.

## 2021-11-30 NOTE — PROGRESS NOTES
Dr Rylee Cevallos at nurses [facility]. States pt reported to him that she is feeing crampying. Dr states to check pt's cervix if she begins to show regular contractions or becomes more uncomfortable.

## 2021-11-30 NOTE — PROGRESS NOTES
Assumed care of pt. Pt reporting feeling contractions. Abdomen soft on palpation. toco adjusted. Pt denies any vaginal bleeding at this time. PO fluids encouraged. RN asked pt to call nurse to room in pain changes or increases in frequency or intensity.

## 2021-11-30 NOTE — PROGRESS NOTES
Patient Active Problem List   Diagnosis    Multiparity    Premature rupture of membranes     premature rupture of membranes (PPROM) with onset of labor after 24 hours of rupture in third trimester, antepartum    No prenatal care in current pregnancy in third trimester    H/O  delivery, currently pregnant     Angeles Fisher is a 19 y.o. female, who is G3(0,2,0,2). She has an Estimated Date of Delivery: 22 based on her LMP. She is currently 30 weeks 3 days gestation based on that assessment. The patient has had no prenatal care with her current pregnancy. The patient was transferred to [facility] secondary to  premature rupture of the amniotic membranes on 2021. The patient was considering termination of pregnancy or adoption. She had 2 prior  deliveries. The patient has had no fever or chills. She had no complaint of abdominal pain or tenderness. She stated that she had some lower abdominal discomfort earlier this morning, but this resolved after she ate breakfast.  Her fetal movement has been good. The fetal heart rate tracing shows moderate variability with accelerations. Occasional variable decelerations are noted. The tracing is category 2. A fetal ultrasound evaluation was performed on 2021. A detailed report included in the EMR under the imaging tab from today's date. A living franco intrauterine fetus was identified in the cephalic presentation, with normal fetal heart motion and normal fetal motion noted. The amniotic fluid index was 2.3 cm consistent with oligohydramnios. Visualization of the fetal anatomy was limited secondary to the decreased amniotic fluid volume. The biometric measurements were consistent with an estimated fetal weight of 2 pounds 11 ounces which places the fetus at the 29th growth percentile based on her estimated date of delivery established by her last menstrual period.   The patient had no prior ultrasound evaluations during this pregnancy. Biometric measurements at this late gestational age are not reliable indicators of gestational age. The biophysical profile was scored 6 of 8, with 2 points deducted for the decreased amniotic fluid volume. The cord Doppler SD ratios were within normal limits for her gestational age. There was no evidence of absence, or reversal of end-diastolic flow. The amniotic fluid index on 2021 was 4.2 cm. The fetus was in the cephalic presentation. The placenta was anterior. The biophysical profile was scored 6 of 8 with 2 points deducted for the decreased amniotic fluid volume. The cord Doppler SD ratio was 3.02, which is within normal limits for the patient's gestational age. There was no absence, or reversal of end-diastolic flow.                  GENETIC SCREENING/TERATOLOGY COUNSELING              (Includes patient, FTB, and any affected family members)    Patient Age > 35 Years NO   Thalassemia ( MVC<80) NO   Congential Heart Defect NO   Neural Tube Defect NO   Armen-Sachs NO   Sickle Cell Disease NO   Sickle Cell Trait NO   Sickle C Disease or Trait NO   Hemophilia NO   Muscular Dystrophy NO   Cystic Fibrosis NO   Dunn Disease NO   Autism NO   Mental Retardation NO   History of Fragile X NO   Maternal Diabetes NO   Other Genetic Disease or Syndrome NO   Previous Child With Congenital Abnormality Not Listed NO   Recreational Drugs NO                                                                 INFECTION HISTORY     HEPATITIS IMMUNIZED:  NO   HEPATITIS INFECTION:  NO   EXPOSURE TO TB NO   PARVOVIRUS B-19 NO   CHICKEN POX  NO   MEASLES NO   HIV NO   OTHER RASH OR VIRAL ILLNESS SINCE LMP NO   UTI RECURRENT NO   HPV NO     OB History    Para Term  AB Living   3 2   2   2   SAB IAB Ectopic Molar Multiple Live Births           0 2      # Outcome Date GA Lbr Michael/2nd Weight Sex Delivery Anes PTL Lv   3 Current            2  10/11/20 36w4d 01:03 / 00:11 5 lb 10.3 oz (2.56 kg) F Vag-Spont None Y SUJATA   1  19 36w5d 10:45 / 00:14 6 lb 6 oz (2.892 kg) M Vag-Spont Local N SUJATA     PAST GYNECOLOGICAL  HISTORY:  Negative for abnormal pap smears. Negative for sexually transmitted diseases. Negative for cervical LEEP / conization /cryosurgery. Negative for uterine surgery. Negative for ovarian or tubal surgery. History reviewed. No pertinent past medical history. History reviewed. No pertinent surgical history. No Known Allergies      Current Facility-Administered Medications:     acetaminophen (TYLENOL) tablet 650 mg, 650 mg, Oral, Q4H PRN, Emiliano Jose MD, 650 mg at 21 1124    ondansetron (ZOFRAN) injection 8 mg, 8 mg, IntraVENous, Q8H PRN, Emiliano Jose MD    famotidine (PEPCID) tablet 20 mg, 20 mg, Oral, BID, Emiliano Jose MD, 20 mg at 21 0908    enoxaparin (LOVENOX) injection 40 mg, 40 mg, SubCUTAneous, Daily, Emiliano Jose MD, 40 mg at 21 0908    amoxicillin (AMOXIL) capsule 250 mg, 250 mg, Oral, 3 times per day, Hai Coleman MD, 250 mg at 21 3789    lactated ringers infusion, , IntraVENous, Continuous, Emiliano Jose MD, Last Rate: 75 mL/hr at 21 0544, New Bag at 21 0544    Social History     Tobacco Use    Smoking status: Former Smoker     Types: Cigarettes    Smokeless tobacco: Never Used   Substance Use Topics    Alcohol use: Never       FAMILY MEDICAL HISTORY:   Negative for congenital abnormalities, autism, genetic disease and mental retardation, not listed above.      Review of Systems :   CONSTITUTIONAL : No fever, no chills   HEENT : No headache, no visual changes, no rhinorrhea, no sore throat   CARDIOVASCULAR : No pain, no palpitations, no edema   RESPIRATORY : No pain, no shortness of breath   GASTROINTESTINAL : No N/V, no D/C, no abdominal pain   GENITOURINARY : No dysuria, hematuria and no incontinence MUSCULOSKELETAL : No myalgia, No back pain  NEUROLOGICAL : No numbness, no tingling, no tremors. No history of seizures  ALL OTHER SYSTEMS WERE REPORTED AS NEGATIVE. PERTINENT PHYSICAL EXAMINATION:   Patient Vitals for the past 24 hrs:   BP Temp Temp src Pulse Resp   21 129/72 98.5 °F (36.9 °C) Oral 71 16   21 1530 137/65 98 °F (36.7 °C) Oral 73 18     GENERAL:   The patient is a well developed, female who is alert cooperative and oriented times three in no acute distress. HEENT:  Normo cephalic and atraumatic. No facial edema. ABDOMEN:   Her uterus is gravid. The patient complained of intermittent uterine contractions. The fetal heart rate is 136 bpm. The fetus was in the cephalic presentation which was confirmed by the ultrasound assessment. EXTREMITIES:  No peripheral edema is noted. PELVIC EXAMINATION:  Not repeated secondary to rupture of the amniotic membranes. Admission on 2021   Component Date Value Ref Range Status    SARS-CoV-2, NAAT 2021 Not Detected  Not Detected Final    ABO/Rh 2021 O NEG   Final    Antibody Screen 2021 NEG   Final    RHIG LOT NUMBER 2021 RhImmuneGlobulin    [id number]          issued       1 vial  21 21:46   Final     IMPRESSION:    1.  IUP at 30 weeks 3 days gestation based on her Estimated Date of Delivery: 22    2. PPROM  2021    3. No prenatal care    4. Oligohydramnios 2021    5. Estimated fetal weight was at the 29th growth percentile 2021    6. Biophysical profile scored 6 of 8, with 2 points deducted for decreased fluid 2021    7. Reassuring cord Doppler SD ratio 2021    8. Cultures for GBS, chlamydia, gonorrhea, are pending    9. O negative blood type  10. Hypochromic microcytic anemia  11. Possible urinary tract infection. Culture pending. 12.  Two previous  deliveries  15. Category 2 fetal heart rate tracing as noted above  14.   Celestone course completed 2021 at 2203 hrs. 15.  The patient received magnesium sulfate for fetal neuro protection 2021  16. RhoGam was administered 2021    PLAN:  The patient will be admitted for evaluation and management until delivery secondary to  premature rupture of the amniotic membranes. Latency antibiotics should be continued for a total of 7 days including the initial 48 hours of intravenous antimicrobial therapy, and 5 additional days of oral antibiotic therapy. No tocolytic medication should be administered secondary to  premature rupture of the amniotic membranes. DVT prophylaxis should be utilized throughout her admission. Fetal heart rate monitoring may be performed 1 hour after breakfast lunch and dinner, unless there is indication for continuous fetal heart rate monitoring. The patient should be monitored closely for evidence of chorioamnionitis. Further evaluation and management will be dependent on the results of the patient's testing and her clinical presentation. If you have any questions regarding her management, please contact me at your convenience and thank you for allowing me to participate in her care.     Bora Light MD, MS, Eric Farmer, GAVIN, RDMS, RVT  Director [address] [place] [address]  [phone]

## 2021-12-01 ENCOUNTER — ANCILLARY PROCEDURE (OUTPATIENT)
Dept: OBGYN CLINIC | Age: 21
DRG: 566 | End: 2021-12-01
Payer: MEDICAID

## 2021-12-01 LAB
C. TRACHOMATIS DNA ,URINE: ABNORMAL
N. GONORRHOEAE DNA, URINE: ABNORMAL
SOURCE: ABNORMAL

## 2021-12-01 PROCEDURE — 6370000000 HC RX 637 (ALT 250 FOR IP): Performed by: OBSTETRICS & GYNECOLOGY

## 2021-12-01 PROCEDURE — 76819 FETAL BIOPHYS PROFIL W/O NST: CPT | Performed by: OBSTETRICS & GYNECOLOGY

## 2021-12-01 PROCEDURE — 6360000002 HC RX W HCPCS: Performed by: OBSTETRICS & GYNECOLOGY

## 2021-12-01 PROCEDURE — 1220000000 HC SEMI PRIVATE OB R&B

## 2021-12-01 PROCEDURE — 76820 UMBILICAL ARTERY ECHO: CPT | Performed by: OBSTETRICS & GYNECOLOGY

## 2021-12-01 PROCEDURE — 2580000003 HC RX 258: Performed by: OBSTETRICS & GYNECOLOGY

## 2021-12-01 RX ORDER — AZITHROMYCIN 250 MG/1
1000 TABLET, FILM COATED ORAL ONCE
Status: COMPLETED | OUTPATIENT
Start: 2021-12-01 | End: 2021-12-01

## 2021-12-01 RX ADMIN — CEFTRIAXONE 1000 MG: 1 INJECTION, POWDER, FOR SOLUTION INTRAMUSCULAR; INTRAVENOUS at 22:35

## 2021-12-01 RX ADMIN — AZITHROMYCIN 1000 MG: 250 TABLET, FILM COATED ORAL at 20:42

## 2021-12-01 RX ADMIN — ENOXAPARIN SODIUM 40 MG: 100 INJECTION SUBCUTANEOUS at 08:46

## 2021-12-01 RX ADMIN — FAMOTIDINE 20 MG: 20 TABLET ORAL at 08:49

## 2021-12-01 RX ADMIN — AMOXICILLIN 250 MG: 250 CAPSULE ORAL at 22:48

## 2021-12-01 RX ADMIN — FAMOTIDINE 20 MG: 20 TABLET ORAL at 20:43

## 2021-12-01 RX ADMIN — AMOXICILLIN 250 MG: 250 CAPSULE ORAL at 16:07

## 2021-12-01 RX ADMIN — AMOXICILLIN 250 MG: 250 CAPSULE ORAL at 06:58

## 2021-12-01 NOTE — PROGRESS NOTES
Department of Obstetrics and Gynecology  Labor and Delivery   Attending Progress Note      SUBJECTIVE:  No complaints    OBJECTIVE:      Vitals:    BP (!) 123/54   Pulse 64   Temp 98.2 °F (36.8 °C) (Oral)   Resp 16   LMP 05/01/2021   SpO2 98%     +GC  + chlamydia  +GBS    ASSESSMENT & PLAN:    HD# 8 PPROM at 29 weeks  Currently 30.4 weeks EGA  Continue ATBx for GBS and new rx for chlamydia and GC  Continue DVT prophylaxis      See orders

## 2021-12-01 NOTE — PROGRESS NOTES
Assumed care of pt. Pt without complaint at this time. Denies vaginal bleeding, changes in lof, and contractions. Perceives feal movement. Vital signs obtained. Call light within reach.

## 2021-12-02 VITALS
TEMPERATURE: 98.4 F | RESPIRATION RATE: 16 BRPM | HEIGHT: 66 IN | OXYGEN SATURATION: 98 % | SYSTOLIC BLOOD PRESSURE: 122 MMHG | DIASTOLIC BLOOD PRESSURE: 63 MMHG | BODY MASS INDEX: 23.4 KG/M2 | HEART RATE: 76 BPM

## 2021-12-02 PROCEDURE — 6370000000 HC RX 637 (ALT 250 FOR IP): Performed by: OBSTETRICS & GYNECOLOGY

## 2021-12-02 PROCEDURE — 6360000002 HC RX W HCPCS: Performed by: OBSTETRICS & GYNECOLOGY

## 2021-12-02 RX ADMIN — AMOXICILLIN 250 MG: 250 CAPSULE ORAL at 13:46

## 2021-12-02 RX ADMIN — AMOXICILLIN 250 MG: 250 CAPSULE ORAL at 06:03

## 2021-12-02 RX ADMIN — ENOXAPARIN SODIUM 40 MG: 100 INJECTION SUBCUTANEOUS at 09:23

## 2021-12-02 RX ADMIN — FAMOTIDINE 20 MG: 20 TABLET ORAL at 09:24

## 2021-12-02 NOTE — PROGRESS NOTES
PA note:    Patient requesting to leave AMA for fear of losing her job. She was advised it is in her and her baby's best interest to stay. She understood that by refusing to stay she is placing herself and her unborn child at significant risk of morbidity and mortality. I advised her to return to the hospital as soon as possible if she changed her mind about being admitted. I advised her that it is critical for her and her baby's health and safety to have appropriate ongoing medical care and monitoring. Dr. Delfino Severin was informed of this. She still chose to leave [facility].       Paris Amador PA-C

## 2021-12-02 NOTE — ADT AUTH CERT
Utilization Reviews          Labor, Threatened - Care Day 7 (2021) by Mi Marquez RN       Review Status Review Entered   Completed 2021 11:51      Criteria Review      Care Day: 7 Care Date: 2021 Level of Care: Inpatient Floor    Guideline Day 2    Level Of Care    ( ) Obstetric floor to discharge    2021 11:51 AM EST by Ramona Ayon       - OB unit    Clinical Status    (X) * Active labor absent    2021 11:51 AM EST by Marjan Garcia denies contractions    ( ) * Fetal distress absent    ( ) * Membranes intact    ( ) * Condition requiring  delivery absent    ( ) * No inciting cause or maternal comorbidity identified that requires inpatient treatment    ( ) * Discharge plans and education understood    Activity    ( ) * Ambulatory or acceptable for next level of care    Routes    ( ) * Oral hydration    2021 11:51 AM EST by Ramona Ayon      cont ivfs lr 75cc/hr    ( ) * Oral medications or regimen acceptable for next level of care    2021 11:51 AM EST by Ramona Ayon      cont po amoxil 250mg tid   cont sq lovenox 40mg qd   cont po pepcid 20mg bid   po zithromax 1gm x1   iv rocephin 1gm x1    (X) * Oral diet or acceptable for next level of care    2021 11:51 AM EST by Ramona Ayon      tolerating general diet    Interventions    (X) Fetal monitoring    2021 11:51 AM EST by Ramona Ayon      TID monitoring    * Milestone   Additional Notes       remains on OB unit    98.1 18 75 139/73        OB/GYN A/P:   +GC   + chlamydia   +GBS       ASSESSMENT & PLAN:   HD# 8 PPROM at 29 weeks   Currently 30.4 weeks EGA   Continue ATBx for GBS and new rx for chlamydia and GC   Continue DVT prophylaxis      MFM A/P:   She is currently 30 weeks 4 days gestation      The amniotic fluid index on 2021 was 6.1 cm.     The fetus was in the cephalic presentation.     The placenta was anterior.     The biophysical profile was scored 8 of 8.     The cord Doppler SD ratio was 3.31, which is within normal limits for the patient's gestational age.     There was no absence, or reversal of end-diastolic flow. IMPRESSION:   IUP at 30 weeks 4 days gestation based on her Estimated Date of Delivery: 22   PPROM  2021   No prenatal care   Oligohydramnios 2021   Estimated fetal weight was at the 29th growth percentile 2021   Biophysical profile scored 8 of 8 2021   Reassuring cord Doppler SD ratio 2021   Cultures for GBS, chlamydia, gonorrhea, are pending   O negative blood type   Hypochromic microcytic anemia   Possible urinary tract infection. Two previous  deliveries   Category 2 fetal heart rate tracing as noted above   Celestone course completed 2021 at 2203 hrs. 1he patient received magnesium sulfate for fetal neuro protection 2021   RhoGam was administered 2021      2112 RN NOTE:   Assumed pt care at this time. RN at bedside. Pt denies vb, ctx. +FM. Call light within reach.     Pt removed from TID monitoring       cont po amoxil 250mg tid    cont sq lovenox 40mg qd    cont po pepcid 20mg bid    po zithromax 1gm x1    iv rocephin 1gm x1    cont ivfs lr 75cc/hr

## 2021-12-02 NOTE — PROGRESS NOTES
Patient Active Problem List   Diagnosis    Multiparity    Premature rupture of membranes     premature rupture of membranes (PPROM) with onset of labor after 24 hours of rupture in third trimester, antepartum    No prenatal care in current pregnancy in third trimester    H/O  delivery, currently pregnant     Joe Cheung is a 19 y.o. female, who is G3(0,2,0,2). She has an Estimated Date of Delivery: 22 based on her LMP. She is currently 30 weeks 4 days gestation based on that assessment. The patient has had no prenatal care with her current pregnancy. The patient was transferred to [facility] secondary to  premature rupture of the amniotic membranes on 2021. The patient was considering termination of pregnancy or adoption. She had 2 prior  deliveries. The patient has had no fever or chills. She had no complaint of abdominal pain or tenderness. She stated that she had some lower abdominal discomfort earlier this morning, but this resolved after she ate breakfast.  Her fetal movement has been good. The fetal heart rate tracing shows moderate variability with accelerations. Occasional variable decelerations are noted. The tracing is category 2. A fetal ultrasound evaluation was performed on 2021. A detailed report included in the EMR under the imaging tab from today's date. A living franco intrauterine fetus was identified in the cephalic presentation, with normal fetal heart motion and normal fetal motion noted. The amniotic fluid index was 2.3 cm consistent with oligohydramnios. Visualization of the fetal anatomy was limited secondary to the decreased amniotic fluid volume. The biometric measurements were consistent with an estimated fetal weight of 2 pounds 11 ounces which places the fetus at the 29th growth percentile based on her estimated date of delivery established by her last menstrual period.   The patient had no prior ultrasound evaluations during this pregnancy. Biometric measurements at this late gestational age are not reliable indicators of gestational age. The biophysical profile was scored 6 of 8, with 2 points deducted for the decreased amniotic fluid volume. The cord Doppler SD ratios were within normal limits for her gestational age. There was no evidence of absence, or reversal of end-diastolic flow. The amniotic fluid index on 2021 was 6.1 cm. The fetus was in the cephalic presentation. The placenta was anterior. The biophysical profile was scored 8 of 8. The cord Doppler SD ratio was 3.31, which is within normal limits for the patient's gestational age. There was no absence, or reversal of end-diastolic flow.                  GENETIC SCREENING/TERATOLOGY COUNSELING              (Includes patient, FTB, and any affected family members)    Patient Age > 35 Years NO   Thalassemia ( MVC<80) NO   Congential Heart Defect NO   Neural Tube Defect NO   Armen-Sachs NO   Sickle Cell Disease NO   Sickle Cell Trait NO   Sickle C Disease or Trait NO   Hemophilia NO   Muscular Dystrophy NO   Cystic Fibrosis NO   [place] [employer] Disease NO   Autism NO   Mental Retardation NO   History of Fragile X NO   Maternal Diabetes NO   Other Genetic Disease or Syndrome NO   Previous Child With Congenital Abnormality Not Listed NO   Recreational Drugs NO                                                                 INFECTION HISTORY     HEPATITIS IMMUNIZED:  NO   HEPATITIS INFECTION:  NO   EXPOSURE TO TB NO   PARVOVIRUS B-19 NO   CHICKEN POX  NO   MEASLES NO   HIV NO   OTHER RASH OR VIRAL ILLNESS SINCE LMP NO   UTI RECURRENT NO   HPV NO     OB History    Para Term  AB Living   3 2   2   2   SAB IAB Ectopic Molar Multiple Live Births           0 2      # Outcome Date GA Lbr Michael/2nd Weight Sex Delivery Anes PTL Lv   3 Current            2  10/11/20 36w4d 01:03 / 00:11 5 lb 10.3 oz (2.56 kg) F Vag-Spont None Y SUJATA   1  19 36w5d 10:45 / 00:14 6 lb 6 oz (2.892 kg) M Vag-Spont Local N SUJATA     PAST GYNECOLOGICAL  HISTORY:  Negative for abnormal pap smears. Negative for sexually transmitted diseases. Negative for cervical LEEP / conization /cryosurgery. Negative for uterine surgery. Negative for ovarian or tubal surgery. History reviewed. No pertinent past medical history. History reviewed. No pertinent surgical history. No Known Allergies      Current Facility-Administered Medications:     azithromycin (ZITHROMAX) tablet 1,000 mg, 1,000 mg, Oral, Once, Chidi Alfredo MD    cefTRIAXone (ROCEPHIN) 1,000 mg in sterile water 10 mL IV syringe, 1,000 mg, IntraVENous, Once, hCidi Alfredo MD    acetaminophen (TYLENOL) tablet 650 mg, 650 mg, Oral, Q4H PRN, Chidi Alfredo MD, 650 mg at 21 1124    ondansetron (ZOFRAN) injection 8 mg, 8 mg, IntraVENous, Q8H PRN, Chidi Alfredo MD    famotidine (PEPCID) tablet 20 mg, 20 mg, Oral, BID, Chidi Alfredo MD, 20 mg at 21 0849    enoxaparin (LOVENOX) injection 40 mg, 40 mg, SubCUTAneous, Daily, Chidi Alfredo MD, 40 mg at 21 0846    amoxicillin (AMOXIL) capsule 250 mg, 250 mg, Oral, 3 times per day, Della Rahman MD, 250 mg at 21 1607    lactated ringers infusion, , IntraVENous, Continuous, Chidi Alfredo MD, Last Rate: 75 mL/hr at 21 0544, New Bag at 21 0544    Social History     Tobacco Use    Smoking status: Former Smoker     Types: Cigarettes    Smokeless tobacco: Never Used   Substance Use Topics    Alcohol use: Never       FAMILY MEDICAL HISTORY:   Negative for congenital abnormalities, autism, genetic disease and mental retardation, not listed above.      Review of Systems :   CONSTITUTIONAL : No fever, no chills   HEENT : No headache, no visual changes, no rhinorrhea, no sore throat   CARDIOVASCULAR : No pain, no palpitations, no edema   RESPIRATORY : No pain, no shortness of breath   GASTROINTESTINAL : No N/V, no D/C, no abdominal pain   GENITOURINARY : No dysuria, hematuria and no incontinence   MUSCULOSKELETAL : No myalgia, No back pain  NEUROLOGICAL : No numbness, no tingling, no tremors. No history of seizures  ALL OTHER SYSTEMS WERE REPORTED AS NEGATIVE. PERTINENT PHYSICAL EXAMINATION:   Patient Vitals for the past 24 hrs:   BP Temp Temp src Pulse Resp SpO2   21 0840 (!) 123/54 98.2 °F (36.8 °C) Oral 64 16 --   21 133/65 98.4 °F (36.9 °C) Oral 72 16 --   21 137/69 98.4 °F (36.9 °C) Oral 67 18 98 %       Admission on 2021   Component Date Value Ref Range Status    SARS-CoV-2, NAAT 2021 Not Detected  Not Detected Final    ABO/Rh 2021 O NEG   Final    Antibody Screen 2021 NEG   Final    RHIG LOT NUMBER 2021 RhImmuneGlobulin    [id number]          issued       1 vial  21 21:46   Final     IMPRESSION:    1.  IUP at 30 weeks 4 days gestation based on her Estimated Date of Delivery: 22    2. PPROM  2021    3. No prenatal care    4. Oligohydramnios 2021    5. Estimated fetal weight was at the 29th growth percentile 2021    6. Biophysical profile scored 8 of 8 2021    7. Reassuring cord Doppler SD ratio 2021    8. Cultures for GBS, chlamydia, gonorrhea, are pending    9. O negative blood type  10. Hypochromic microcytic anemia  11. Possible urinary tract infection. Culture pending. 12.  Two previous  deliveries  15. Category 2 fetal heart rate tracing as noted above  14. Celestone course completed 2021 at 2203 hrs. 15.  The patient received magnesium sulfate for fetal neuro protection 2021  16. RhoGam was administered 2021    PLAN:  The patient will be admitted for evaluation and management until delivery secondary to  premature rupture of the amniotic membranes.     Latency antibiotics should be continued for a total of 7 days

## 2021-12-02 NOTE — PROGRESS NOTES
Nutrition Assessment     Type and Reason for Visit: Initial, RD Nutrition Re-Screen/LOS    Nutrition Recommendations/Plan: Continue current diet. Nutrition Assessment:  Pt 30.4 weeks EGA. Pt doing well. Tolerating diet w/ no N/V/D. Nutrition Related Findings: A&Ox4, BS active, Abd WDL, no Edema, -I/Os      Current Nutrition Therapies:    ADULT DIET; Regular    Anthropometric Measures:  · Height: 5' 6\" (167.6 cm)  · Current Body Wt: 145 lb (65.8 kg) (pregnant stated 11/24)   · BMI: 23.4    Nutrition Diagnosis:   · No nutrition diagnosis at this time related to   as evidenced by      Nutrition Interventions:   Food and/or Nutrient Delivery:  Continue Current Diet  Nutrition Education/Counseling:  No recommendation at this time   Coordination of Nutrition Care:  Continue to monitor while inpatient    Goals:  >75% of meals consumed       Nutrition Monitoring and Evaluation:   Behavioral-Environmental Outcomes:  None Identified   Food/Nutrient Intake Outcomes:  Food and Nutrient Intake  Physical Signs/Symptoms Outcomes:  Biochemical Data, Nutrition Focused Physical Findings, Skin, Weight, Fluid Status or Edema     Discharge Planning:     Too soon to determine     Electronically signed by Jeannine Gray RD, ALEJANDRA on 12/2/21 at 10:50 AM EST    Contact: [phone]

## 2021-12-02 NOTE — PROGRESS NOTES
Pt refusing to have monitor on at this time. States she is leaving, needs to go home. States her mother is watching her kids and is going to lose her job if she doesn't go back to work. MARIBELL San aware and will talk with patient.

## 2021-12-02 NOTE — PROGRESS NOTES
Pt left AMA ambulatory by herself. Voiced understanding of possible complications to her and baby if she leaves.

## 2021-12-02 NOTE — PROGRESS NOTES
Assumed pt care at this time. RN at bedside. Pt denies vb, ctx. +FM. Call light within reach.  Pt removed from TID monitoring

## 2021-12-02 NOTE — PROGRESS NOTES
Ppt denies any c/o at this time, +fetal movement. Aware to call ouit after breakfast for EFM to be put on. Call light in reach.

## 2021-12-07 ENCOUNTER — HOSPITAL ENCOUNTER (INPATIENT)
Age: 21
LOS: 1 days | Discharge: HOME | DRG: 560 | End: 2021-12-08
Attending: OBSTETRICS & GYNECOLOGY | Admitting: OBSTETRICS & GYNECOLOGY
Payer: MEDICAID

## 2021-12-07 ENCOUNTER — ANESTHESIA (OUTPATIENT)
Dept: LABOR AND DELIVERY | Age: 21
DRG: 560 | End: 2021-12-07
Payer: MEDICAID

## 2021-12-07 ENCOUNTER — ANESTHESIA EVENT (OUTPATIENT)
Dept: LABOR AND DELIVERY | Age: 21
DRG: 560 | End: 2021-12-07
Payer: MEDICAID

## 2021-12-07 VITALS
OXYGEN SATURATION: 100 % | RESPIRATION RATE: 15 BRPM | SYSTOLIC BLOOD PRESSURE: 153 MMHG | TEMPERATURE: 97.7 F | DIASTOLIC BLOOD PRESSURE: 85 MMHG

## 2021-12-07 PROBLEM — Z33.1 PREGNANCY AS INCIDENTAL FINDING: Status: ACTIVE | Noted: 2021-12-07

## 2021-12-07 LAB
ABO/RH: NORMAL
ALBUMIN SERPL-MCNC: 3.5 G/DL (ref 3.5–5.2)
ALP BLD-CCNC: 96 U/L (ref 35–104)
ALT SERPL-CCNC: 8 U/L (ref 0–32)
AMPHETAMINE SCREEN, URINE: NOT DETECTED
ANION GAP SERPL CALCULATED.3IONS-SCNC: 13 MMOL/L (ref 7–16)
ANTIBODY SCREEN: NORMAL
AST SERPL-CCNC: 12 U/L (ref 0–31)
BARBITURATE SCREEN URINE: NOT DETECTED
BENZODIAZEPINE SCREEN, URINE: NOT DETECTED
BILIRUB SERPL-MCNC: 0.3 MG/DL (ref 0–1.2)
BUN BLDV-MCNC: 9 MG/DL (ref 6–20)
CALCIUM SERPL-MCNC: 9.1 MG/DL (ref 8.6–10.2)
CANNABINOID SCREEN URINE: NOT DETECTED
CHLORIDE BLD-SCNC: 103 MMOL/L (ref 98–107)
CO2: 17 MMOL/L (ref 22–29)
COCAINE METABOLITE SCREEN URINE: NOT DETECTED
CREAT SERPL-MCNC: 0.6 MG/DL (ref 0.5–1)
DAT POLYSPECIFIC: NORMAL
FENTANYL SCREEN, URINE: NOT DETECTED
GFR AFRICAN AMERICAN: >60
GFR NON-AFRICAN AMERICAN: >60 ML/MIN/1.73
GLUCOSE BLD-MCNC: 91 MG/DL (ref 74–99)
HCT VFR BLD CALC: 31.5 % (ref 34–48)
HEMOGLOBIN: 10.3 G/DL (ref 11.5–15.5)
Lab: NORMAL
MCH RBC QN AUTO: 26.1 PG (ref 26–35)
MCHC RBC AUTO-ENTMCNC: 32.7 % (ref 32–34.5)
MCV RBC AUTO: 79.9 FL (ref 80–99.9)
METHADONE SCREEN, URINE: NOT DETECTED
OPIATE SCREEN URINE: NOT DETECTED
OXYCODONE URINE: NOT DETECTED
PDW BLD-RTO: 16.2 FL (ref 11.5–15)
PHENCYCLIDINE SCREEN URINE: NOT DETECTED
PLATELET # BLD: 263 E9/L (ref 130–450)
PMV BLD AUTO: 10.9 FL (ref 7–12)
POTASSIUM SERPL-SCNC: 3.8 MMOL/L (ref 3.5–5)
RBC # BLD: 3.94 E12/L (ref 3.5–5.5)
SODIUM BLD-SCNC: 133 MMOL/L (ref 132–146)
TOTAL PROTEIN: 7.2 G/DL (ref 6.4–8.3)
WBC # BLD: 17.4 E9/L (ref 4.5–11.5)

## 2021-12-07 PROCEDURE — 86901 BLOOD TYPING SEROLOGIC RH(D): CPT

## 2021-12-07 PROCEDURE — 3700000000 HC ANESTHESIA ATTENDED CARE: Performed by: OBSTETRICS & GYNECOLOGY

## 2021-12-07 PROCEDURE — 36415 COLL VENOUS BLD VENIPUNCTURE: CPT

## 2021-12-07 PROCEDURE — 1220000001 HC SEMI PRIVATE L&D R&B

## 2021-12-07 PROCEDURE — 6360000002 HC RX W HCPCS: Performed by: NURSE ANESTHETIST, CERTIFIED REGISTERED

## 2021-12-07 PROCEDURE — 88307 TISSUE EXAM BY PATHOLOGIST: CPT

## 2021-12-07 PROCEDURE — 80053 COMPREHEN METABOLIC PANEL: CPT

## 2021-12-07 PROCEDURE — 3700000001 HC ADD 15 MINUTES (ANESTHESIA): Performed by: OBSTETRICS & GYNECOLOGY

## 2021-12-07 PROCEDURE — 7100000001 HC PACU RECOVERY - ADDTL 15 MIN: Performed by: OBSTETRICS & GYNECOLOGY

## 2021-12-07 PROCEDURE — 85027 COMPLETE CBC AUTOMATED: CPT

## 2021-12-07 PROCEDURE — 86850 RBC ANTIBODY SCREEN: CPT

## 2021-12-07 PROCEDURE — 7200000001 HC VAGINAL DELIVERY

## 2021-12-07 PROCEDURE — 6370000000 HC RX 637 (ALT 250 FOR IP): Performed by: OBSTETRICS & GYNECOLOGY

## 2021-12-07 PROCEDURE — 86900 BLOOD TYPING SEROLOGIC ABO: CPT

## 2021-12-07 PROCEDURE — 6360000002 HC RX W HCPCS: Performed by: OBSTETRICS & GYNECOLOGY

## 2021-12-07 PROCEDURE — 86880 COOMBS TEST DIRECT: CPT

## 2021-12-07 PROCEDURE — 3600000012 HC SURGERY LEVEL 2 ADDTL 15MIN: Performed by: OBSTETRICS & GYNECOLOGY

## 2021-12-07 PROCEDURE — 10D17Z9 MANUAL EXTRACTION OF PRODUCTS OF CONCEPTION, RETAINED, VIA NATURAL OR ARTIFICIAL OPENING: ICD-10-PCS | Performed by: OBSTETRICS & GYNECOLOGY

## 2021-12-07 PROCEDURE — 2500000003 HC RX 250 WO HCPCS: Performed by: NURSE ANESTHETIST, CERTIFIED REGISTERED

## 2021-12-07 PROCEDURE — 6360000002 HC RX W HCPCS

## 2021-12-07 PROCEDURE — 7100000000 HC PACU RECOVERY - FIRST 15 MIN: Performed by: OBSTETRICS & GYNECOLOGY

## 2021-12-07 PROCEDURE — 2580000003 HC RX 258: Performed by: NURSE ANESTHETIST, CERTIFIED REGISTERED

## 2021-12-07 PROCEDURE — 3600000002 HC SURGERY LEVEL 2 BASE: Performed by: OBSTETRICS & GYNECOLOGY

## 2021-12-07 PROCEDURE — 2709999900 HC NON-CHARGEABLE SUPPLY: Performed by: OBSTETRICS & GYNECOLOGY

## 2021-12-07 PROCEDURE — 80307 DRUG TEST PRSMV CHEM ANLYZR: CPT

## 2021-12-07 PROCEDURE — 86870 RBC ANTIBODY IDENTIFICATION: CPT

## 2021-12-07 RX ORDER — KETOROLAC TROMETHAMINE 30 MG/ML
INJECTION, SOLUTION INTRAMUSCULAR; INTRAVENOUS PRN
Status: DISCONTINUED | OUTPATIENT
Start: 2021-12-07 | End: 2021-12-07 | Stop reason: SDUPTHER

## 2021-12-07 RX ORDER — DOCUSATE SODIUM 100 MG/1
100 CAPSULE, LIQUID FILLED ORAL 2 TIMES DAILY
Status: DISCONTINUED | OUTPATIENT
Start: 2021-12-07 | End: 2021-12-08 | Stop reason: HOSPADM

## 2021-12-07 RX ORDER — SODIUM CHLORIDE 0.9 % (FLUSH) 0.9 %
5-40 SYRINGE (ML) INJECTION PRN
Status: DISCONTINUED | OUTPATIENT
Start: 2021-12-07 | End: 2021-12-08 | Stop reason: HOSPADM

## 2021-12-07 RX ORDER — HYDROCODONE BITARTRATE AND ACETAMINOPHEN 5; 325 MG/1; MG/1
2 TABLET ORAL PRN
Status: DISCONTINUED | OUTPATIENT
Start: 2021-12-07 | End: 2021-12-07

## 2021-12-07 RX ORDER — PROMETHAZINE HYDROCHLORIDE 25 MG/ML
6.25 INJECTION, SOLUTION INTRAMUSCULAR; INTRAVENOUS
Status: DISCONTINUED | OUTPATIENT
Start: 2021-12-07 | End: 2021-12-07

## 2021-12-07 RX ORDER — HYDROCODONE BITARTRATE AND ACETAMINOPHEN 5; 325 MG/1; MG/1
1 TABLET ORAL PRN
Status: DISCONTINUED | OUTPATIENT
Start: 2021-12-07 | End: 2021-12-07

## 2021-12-07 RX ORDER — LIDOCAINE HYDROCHLORIDE 20 MG/ML
INJECTION, SOLUTION INTRAVENOUS PRN
Status: DISCONTINUED | OUTPATIENT
Start: 2021-12-07 | End: 2021-12-07 | Stop reason: SDUPTHER

## 2021-12-07 RX ORDER — ONDANSETRON 4 MG/1
8 TABLET, ORALLY DISINTEGRATING ORAL EVERY 8 HOURS PRN
Status: DISCONTINUED | OUTPATIENT
Start: 2021-12-07 | End: 2021-12-08 | Stop reason: HOSPADM

## 2021-12-07 RX ORDER — ONDANSETRON 2 MG/ML
4 INJECTION INTRAMUSCULAR; INTRAVENOUS EVERY 6 HOURS PRN
Status: DISCONTINUED | OUTPATIENT
Start: 2021-12-07 | End: 2021-12-07

## 2021-12-07 RX ORDER — SODIUM CHLORIDE 9 MG/ML
25 INJECTION, SOLUTION INTRAVENOUS PRN
Status: DISCONTINUED | OUTPATIENT
Start: 2021-12-07 | End: 2021-12-08 | Stop reason: HOSPADM

## 2021-12-07 RX ORDER — OXYCODONE HYDROCHLORIDE AND ACETAMINOPHEN 5; 325 MG/1; MG/1
1 TABLET ORAL EVERY 4 HOURS PRN
Status: DISCONTINUED | OUTPATIENT
Start: 2021-12-07 | End: 2021-12-08 | Stop reason: HOSPADM

## 2021-12-07 RX ORDER — SODIUM CHLORIDE 9 MG/ML
25 INJECTION, SOLUTION INTRAVENOUS PRN
Status: DISCONTINUED | OUTPATIENT
Start: 2021-12-07 | End: 2021-12-07

## 2021-12-07 RX ORDER — IBUPROFEN 800 MG/1
800 TABLET ORAL EVERY 6 HOURS PRN
Status: DISCONTINUED | OUTPATIENT
Start: 2021-12-07 | End: 2021-12-08 | Stop reason: HOSPADM

## 2021-12-07 RX ORDER — HYDRALAZINE HYDROCHLORIDE 20 MG/ML
5 INJECTION INTRAMUSCULAR; INTRAVENOUS EVERY 10 MIN PRN
Status: DISCONTINUED | OUTPATIENT
Start: 2021-12-07 | End: 2021-12-07

## 2021-12-07 RX ORDER — SODIUM CHLORIDE, SODIUM LACTATE, POTASSIUM CHLORIDE, AND CALCIUM CHLORIDE .6; .31; .03; .02 G/100ML; G/100ML; G/100ML; G/100ML
1000 INJECTION, SOLUTION INTRAVENOUS PRN
Status: DISCONTINUED | OUTPATIENT
Start: 2021-12-07 | End: 2021-12-07

## 2021-12-07 RX ORDER — NALBUPHINE HCL 10 MG/ML
10 AMPUL (ML) INJECTION ONCE
Status: DISCONTINUED | OUTPATIENT
Start: 2021-12-07 | End: 2021-12-07

## 2021-12-07 RX ORDER — ACETAMINOPHEN 325 MG/1
650 TABLET ORAL EVERY 4 HOURS PRN
Status: DISCONTINUED | OUTPATIENT
Start: 2021-12-07 | End: 2021-12-08 | Stop reason: HOSPADM

## 2021-12-07 RX ORDER — FENTANYL CITRATE 50 UG/ML
INJECTION, SOLUTION INTRAMUSCULAR; INTRAVENOUS PRN
Status: DISCONTINUED | OUTPATIENT
Start: 2021-12-07 | End: 2021-12-07 | Stop reason: SDUPTHER

## 2021-12-07 RX ORDER — LIDOCAINE HYDROCHLORIDE 10 MG/ML
INJECTION, SOLUTION INFILTRATION; PERINEURAL
Status: DISCONTINUED
Start: 2021-12-07 | End: 2021-12-07

## 2021-12-07 RX ORDER — MODIFIED LANOLIN
OINTMENT (GRAM) TOPICAL PRN
Status: DISCONTINUED | OUTPATIENT
Start: 2021-12-07 | End: 2021-12-08 | Stop reason: HOSPADM

## 2021-12-07 RX ORDER — PROCHLORPERAZINE EDISYLATE 5 MG/ML
5 INJECTION INTRAMUSCULAR; INTRAVENOUS
Status: DISCONTINUED | OUTPATIENT
Start: 2021-12-07 | End: 2021-12-07

## 2021-12-07 RX ORDER — ONDANSETRON 2 MG/ML
INJECTION INTRAMUSCULAR; INTRAVENOUS PRN
Status: DISCONTINUED | OUTPATIENT
Start: 2021-12-07 | End: 2021-12-07 | Stop reason: SDUPTHER

## 2021-12-07 RX ORDER — FERROUS SULFATE 325(65) MG
325 TABLET ORAL 2 TIMES DAILY WITH MEALS
Status: DISCONTINUED | OUTPATIENT
Start: 2021-12-08 | End: 2021-12-08 | Stop reason: HOSPADM

## 2021-12-07 RX ORDER — SODIUM CHLORIDE, SODIUM LACTATE, POTASSIUM CHLORIDE, CALCIUM CHLORIDE 600; 310; 30; 20 MG/100ML; MG/100ML; MG/100ML; MG/100ML
INJECTION, SOLUTION INTRAVENOUS CONTINUOUS
Status: DISCONTINUED | OUTPATIENT
Start: 2021-12-07 | End: 2021-12-07

## 2021-12-07 RX ORDER — DEXAMETHASONE SODIUM PHOSPHATE 10 MG/ML
INJECTION, SOLUTION INTRAMUSCULAR; INTRAVENOUS PRN
Status: DISCONTINUED | OUTPATIENT
Start: 2021-12-07 | End: 2021-12-07 | Stop reason: SDUPTHER

## 2021-12-07 RX ORDER — SODIUM CHLORIDE, SODIUM LACTATE, POTASSIUM CHLORIDE, CALCIUM CHLORIDE 600; 310; 30; 20 MG/100ML; MG/100ML; MG/100ML; MG/100ML
INJECTION, SOLUTION INTRAVENOUS CONTINUOUS PRN
Status: DISCONTINUED | OUTPATIENT
Start: 2021-12-07 | End: 2021-12-07 | Stop reason: SDUPTHER

## 2021-12-07 RX ORDER — SODIUM CHLORIDE, SODIUM LACTATE, POTASSIUM CHLORIDE, AND CALCIUM CHLORIDE .6; .31; .03; .02 G/100ML; G/100ML; G/100ML; G/100ML
500 INJECTION, SOLUTION INTRAVENOUS PRN
Status: DISCONTINUED | OUTPATIENT
Start: 2021-12-07 | End: 2021-12-07

## 2021-12-07 RX ORDER — MEPERIDINE HYDROCHLORIDE 25 MG/ML
12.5 INJECTION INTRAMUSCULAR; INTRAVENOUS; SUBCUTANEOUS EVERY 5 MIN PRN
Status: DISCONTINUED | OUTPATIENT
Start: 2021-12-07 | End: 2021-12-07

## 2021-12-07 RX ORDER — PENICILLIN G POTASSIUM 5000000 [IU]/1
INJECTION, POWDER, FOR SOLUTION INTRAMUSCULAR; INTRAVENOUS
Status: DISCONTINUED
Start: 2021-12-07 | End: 2021-12-07

## 2021-12-07 RX ORDER — SODIUM CHLORIDE 0.9 % (FLUSH) 0.9 %
5-40 SYRINGE (ML) INJECTION EVERY 12 HOURS SCHEDULED
Status: DISCONTINUED | OUTPATIENT
Start: 2021-12-07 | End: 2021-12-07

## 2021-12-07 RX ORDER — LABETALOL HYDROCHLORIDE 5 MG/ML
5 INJECTION, SOLUTION INTRAVENOUS EVERY 10 MIN PRN
Status: DISCONTINUED | OUTPATIENT
Start: 2021-12-07 | End: 2021-12-07

## 2021-12-07 RX ORDER — DIPHENHYDRAMINE HYDROCHLORIDE 50 MG/ML
12.5 INJECTION INTRAMUSCULAR; INTRAVENOUS
Status: DISCONTINUED | OUTPATIENT
Start: 2021-12-07 | End: 2021-12-07

## 2021-12-07 RX ORDER — NALBUPHINE HCL 10 MG/ML
AMPUL (ML) INJECTION
Status: COMPLETED
Start: 2021-12-07 | End: 2021-12-07

## 2021-12-07 RX ORDER — DOCUSATE SODIUM 100 MG/1
100 CAPSULE, LIQUID FILLED ORAL 2 TIMES DAILY
Status: DISCONTINUED | OUTPATIENT
Start: 2021-12-07 | End: 2021-12-07

## 2021-12-07 RX ORDER — SUCCINYLCHOLINE/SOD CL,ISO/PF 200MG/10ML
SYRINGE (ML) INTRAVENOUS PRN
Status: DISCONTINUED | OUTPATIENT
Start: 2021-12-07 | End: 2021-12-07 | Stop reason: SDUPTHER

## 2021-12-07 RX ORDER — SODIUM CHLORIDE 0.9 % (FLUSH) 0.9 %
5-40 SYRINGE (ML) INJECTION EVERY 12 HOURS SCHEDULED
Status: DISCONTINUED | OUTPATIENT
Start: 2021-12-07 | End: 2021-12-08 | Stop reason: HOSPADM

## 2021-12-07 RX ORDER — SODIUM CHLORIDE 0.9 % (FLUSH) 0.9 %
5-40 SYRINGE (ML) INJECTION PRN
Status: DISCONTINUED | OUTPATIENT
Start: 2021-12-07 | End: 2021-12-07

## 2021-12-07 RX ORDER — PROPOFOL 10 MG/ML
INJECTION, EMULSION INTRAVENOUS PRN
Status: DISCONTINUED | OUTPATIENT
Start: 2021-12-07 | End: 2021-12-07 | Stop reason: SDUPTHER

## 2021-12-07 RX ORDER — OXYCODONE HYDROCHLORIDE AND ACETAMINOPHEN 5; 325 MG/1; MG/1
2 TABLET ORAL EVERY 4 HOURS PRN
Status: DISCONTINUED | OUTPATIENT
Start: 2021-12-07 | End: 2021-12-08 | Stop reason: HOSPADM

## 2021-12-07 RX ADMIN — NALBUPHINE HYDROCHLORIDE 10 MG: 10 INJECTION, SOLUTION INTRAMUSCULAR; INTRAVENOUS; SUBCUTANEOUS at 06:17

## 2021-12-07 RX ADMIN — SODIUM CHLORIDE, POTASSIUM CHLORIDE, SODIUM LACTATE AND CALCIUM CHLORIDE: 600; 310; 30; 20 INJECTION, SOLUTION INTRAVENOUS at 07:45

## 2021-12-07 RX ADMIN — Medication 87.3 MILLI-UNITS/MIN: at 07:35

## 2021-12-07 RX ADMIN — Medication 140 MG: at 07:15

## 2021-12-07 RX ADMIN — DEXAMETHASONE SODIUM PHOSPHATE 10 MG: 10 INJECTION, SOLUTION INTRAMUSCULAR; INTRAVENOUS at 07:20

## 2021-12-07 RX ADMIN — OXYCODONE AND ACETAMINOPHEN 2 TABLET: 5; 325 TABLET ORAL at 10:52

## 2021-12-07 RX ADMIN — KETOROLAC TROMETHAMINE 30 MG: 30 INJECTION, SOLUTION INTRAMUSCULAR at 07:26

## 2021-12-07 RX ADMIN — ONDANSETRON 4 MG: 2 INJECTION INTRAMUSCULAR; INTRAVENOUS at 07:22

## 2021-12-07 RX ADMIN — FENTANYL CITRATE 100 MCG: 50 INJECTION, SOLUTION INTRAMUSCULAR; INTRAVENOUS at 07:15

## 2021-12-07 RX ADMIN — PROPOFOL 200 MG: 10 INJECTION, EMULSION INTRAVENOUS at 07:15

## 2021-12-07 RX ADMIN — Medication 999 ML/HR: at 07:18

## 2021-12-07 RX ADMIN — LIDOCAINE HYDROCHLORIDE 100 MG: 20 INJECTION, SOLUTION INTRAVENOUS at 07:15

## 2021-12-07 RX ADMIN — SODIUM CHLORIDE, POTASSIUM CHLORIDE, SODIUM LACTATE AND CALCIUM CHLORIDE: 600; 310; 30; 20 INJECTION, SOLUTION INTRAVENOUS at 07:09

## 2021-12-07 ASSESSMENT — PULMONARY FUNCTION TESTS
PIF_VALUE: 3
PIF_VALUE: 0
PIF_VALUE: 1
PIF_VALUE: 0
PIF_VALUE: 2
PIF_VALUE: 29
PIF_VALUE: 28
PIF_VALUE: 13
PIF_VALUE: 0
PIF_VALUE: 3
PIF_VALUE: 27
PIF_VALUE: 0
PIF_VALUE: 28
PIF_VALUE: 14
PIF_VALUE: 13
PIF_VALUE: 3
PIF_VALUE: 14
PIF_VALUE: 0
PIF_VALUE: 1
PIF_VALUE: 3
PIF_VALUE: 3
PIF_VALUE: 13
PIF_VALUE: 27
PIF_VALUE: 13

## 2021-12-07 ASSESSMENT — PAIN SCALES - GENERAL
PAINLEVEL_OUTOF10: 8
PAINLEVEL_OUTOF10: 9

## 2021-12-07 NOTE — PROGRESS NOTES
31w3d patient arrived to unit via ambulance with c/o's contractions, bleeding, and ROM since . Patient perceivces fetal movement. Placed on EFM and TOCO with audible fetal movement. SVE done, patient is complete.

## 2021-12-07 NOTE — ANESTHESIA POSTPROCEDURE EVALUATION
Department of Anesthesiology  Postprocedure Note    Patient: Jesús Mchugh  MRN: [id number]  YOB: 2000  Date of evaluation: 2021  Time:  8:22 AM     Procedure Summary     Date: 21 Room / Location: [address] [place] [address]    Anesthesia Start: 0700 Anesthesia Stop: 280    Procedure:  SECTION (N/A Abdomen) Diagnosis:     Surgeons: Ameena James MD Responsible Provider: Elena Gamino MD    Anesthesia Type: general ASA Status: 2          Anesthesia Type: general    Martha Phase I: Martha Score: 8    Martha Phase II:      Last vitals: Reviewed and per EMR flowsheets.        Anesthesia Post Evaluation    Patient location during evaluation: bedside  Patient participation: complete - patient participated  Level of consciousness: awake  Pain score: 2  Airway patency: patent  Nausea & Vomiting: no nausea and no vomiting  Complications: no  Cardiovascular status: hemodynamically stable  Respiratory status: acceptable  Hydration status: euvolemic    as above  Rosa Arnett, APRN - CRNA

## 2021-12-07 NOTE — H&P
Subjective:      Ana María Medrano is an 19 y.o. female at 32 and 3/7 weeks gestation presenting labor and delivery in active labor brought by squad. Patient discharged herself [address] on 2021 from [facility]. History of prolonged premature rupture of membranes since 2021. Patient received Celestone and magnesium sulfate. On admission she was fully dilated. Fetal Movement: normal.  Patient was voluntarily pushing and delivered a live female  was immediately attended to by Dr. Neal Quigley. Pitocin was started and placenta was not delivered despite multiple attempts. Manual removal was attempted but could not be completed because of patient's uncooperativeness. No past medical history on file. Review of Systems  Pertinent items are noted in HPI. Objective:      LMP 2021   Last menstrual period 2021, unknown if currently breastfeeding. General:   alert, appears stated age and cooperative   Cervix:   Fully dilated with vertex at S+ 2.    FHT:   140 BPM     Lab Review    CBC:   Lab Results   Component Value Date    WBC 17.4 2021    RBC 3.94 2021    HGB 10.3 2021    HCT 31.5 2021    MCV 79.9 2021    MCH 26.1 2021    MCHC 32.7 2021    RDW 16.2 2021     2021    MPV 10.9 2021     CMP:    Lab Results   Component Value Date     2021    K 3.9 2021     2021    CO2 19 2021    BUN 6 2021    CREATININE 0.6 2021    GFRAA >60 2021    LABGLOM >60 2021    GLUCOSE 94 2021    PROT 6.3 2021    LABALBU 3.4 2021    CALCIUM 8.6 2021    BILITOT <0.2 2021    ALKPHOS 84 2021    AST 9 2021    ALT 5 2021     U/A:    Lab Results   Component Value Date    COLORU Yellow 2021    PHUR 6.5 2021    WBCUA 10-20 2021    RBCUA 1-3 2021    TRICHOMONAS MODERATE 2017    BACTERIA FEW 2021    CLARITYU SLCLOUDY 2021    SPECGRAV 1.025 2021    LEUKOCYTESUR SMALL 2021    UROBILINOGEN 2.0 2021    BILIRUBINUR Negative 2021    BLOODU Negative 2021    GLUCOSEU Negative 2021    AMORPHOUS RARE 07/15/2019          Assessment:      Advanced premature Labor     Premature prolonged rupture of membranes  No prenatal care  Retained placenta     Plan:     Manual removal of placenta in the operating room  Dr. Meggan Bazan notified and will try to send somebody from anesthesia as soon as possible    41 Mariano Munguia MD,MD,2021 6:27 AM

## 2021-12-07 NOTE — ANESTHESIA PRE PROCEDURE
Department of Anesthesiology  Preprocedure Note       Name:  Jodi Rome   Age:  19 y.o.  :  2000                                          MRN:  [id number]         Date:  2021      Surgeon: Audrey Quiroz):  Mariella Galindo MD    Procedure: SUCTION D&C    Medications prior to admission:   Prior to Admission medications    Not on File       Current medications:    Current Facility-Administered Medications   Medication Dose Route Frequency Provider Last Rate Last Admin    lactated ringers infusion   IntraVENous Continuous Amine ISAIAS Fagan MD        lactated ringers bolus  500 mL IntraVENous PRN Amine ISAIAS Fagan MD        Or    lactated ringers bolus  1,000 mL IntraVENous PRN Amine ISAIAS Fagan MD        sodium chloride flush 0.9 % injection 5-40 mL  5-40 mL IntraVENous 2 times per day Amine ISAIAS Fagan MD        sodium chloride flush 0.9 % injection 5-40 mL  5-40 mL IntraVENous PRN Dayanara Fagan MD        0.9 % sodium chloride infusion  25 mL IntraVENous PRN Dayanara Fagan MD        oxytocin (PITOCIN) 30 units in 500 mL infusion  87.3 elizabeth-units/min IntraVENous Continuous PRN Dayanara Fagan MD        And    oxytocin (PITOCIN) 30 units in 500 mL infusion  909 elizabeth-units/min IntraVENous PRN Dayanara Fagan MD        ondansetron (ZOFRAN) injection 4 mg  4 mg IntraVENous Q6H PRN Dayanara Fagan MD        docusate sodium (COLACE) capsule 100 mg  100 mg Oral BID Dayanara Fagan MD        penicillin G potassium [id number] units injection             dextrose 5 % infusion             lidocaine 1 % injection             oxytocin (PITOCIN) 30 units in 500 mL infusion Override Pull             nalbuphine (NUBAIN) injection 10 mg  10 mg IntraVENous Once Dayanara Fagan MD           Allergies:  No Known Allergies    Problem List:    Patient Active Problem List   Diagnosis Code    Multiparity Z64.1    Premature rupture of membranes O42.90     premature rupture of membranes (PPROM) with onset of labor after 24 hours of rupture in third trimester, antepartum O42. 80    No prenatal care in current pregnancy in third trimester O09.33    H/O  delivery, currently pregnant O09.899    Pregnancy as incidental finding Z33.1       Past Medical History:  No past medical history on file. Past Surgical History:  No past surgical history on file. Social History:    Social History     Tobacco Use    Smoking status: Former Smoker     Types: Cigarettes    Smokeless tobacco: Never Used   Substance Use Topics    Alcohol use: Never                                Counseling given: Not Answered      Vital Signs (Current): There were no vitals filed for this visit. BP Readings from Last 3 Encounters:   21 122/63   21 117/61   10/12/20 118/62       NPO Status:                                                                                 BMI:   Wt Readings from Last 3 Encounters:   21 145 lb (65.8 kg)   10/11/20 183 lb (83 kg)   10/29/19 158 lb (71.7 kg)     There is no height or weight on file to calculate BMI.    CBC:   Lab Results   Component Value Date    WBC 17.4 2021    RBC 3.94 2021    HGB 10.3 2021    HCT 31.5 2021    MCV 79.9 2021    RDW 16.2 2021     2021       CMP:   Lab Results   Component Value Date     2021    K 3.8 2021    K 3.9 2021     2021    CO2 17 2021    BUN 9 2021    CREATININE 0.6 2021    GFRAA >60 2021    LABGLOM >60 2021    GLUCOSE 91 2021    PROT 7.2 2021    CALCIUM 9.1 2021    BILITOT 0.3 2021    ALKPHOS 96 2021    AST 12 2021    ALT 8 2021       POC Tests: No results for input(s): POCGLU, POCNA, POCK, POCCL, POCBUN, POCHEMO, POCHCT in the last 72 hours.     Coags: No results found for: PROTIME, INR, APTT    HCG (If Applicable):   Lab Results   Component Value Date    PREGTESTUR NEGATIVE 01/26/2018        ABGs: No results found for: PHART, PO2ART, RPB3MFP, XBG5RIK, BEART, T1FLTMWZ     Type & Screen (If Applicable):  No results found for: LABABO, LABRH    Drug/Infectious Status (If Applicable):  No results found for: HIV, HEPCAB    COVID-19 Screening (If Applicable):   Lab Results   Component Value Date    COVID19 Not Detected 11/25/2021           Anesthesia Evaluation  Patient summary reviewed  Airway: Mallampati: II        Dental:      Comment: Dentition intact, missing a lower left molar, denies any loose teeth. Pulmonary:normal exam  breath sounds clear to auscultation                            ROS comment: Former smoker: 0.5 PPD x 5 years quit when she became pregnant 8 months ago. Cardiovascular:Negative CV ROS    (+) murmur ( Grade 2/6 murmur),         Rhythm: regular  Rate: normal                    Neuro/Psych:   Negative Neuro/Psych ROS              GI/Hepatic/Renal:   (+) GERD:,           Endo/Other: Negative Endo/Other ROS                    Abdominal:             Vascular: negative vascular ROS. Other Findings:             Anesthesia Plan      general     ASA 2     (Retained full placenta)  Induction: intravenous. MIPS: Postoperative opioids intended. Anesthetic plan and risks discussed with patient. Plan discussed with CRNA.                 Radha Powell MD   12/7/2021  As above  DONG Beckwith - RON

## 2021-12-07 NOTE — OP NOTE
Operative Note      Patient: Nicki Parker  YOB: 2000  MRN: [id number]    Date of Procedure: 12/7/2021    Pre-Op Diagnosis: Retained placenta    Post-Op Diagnosis: Same       Procedure: Manual removal of placenta  Surgeon(s):  Julieta Shelton MD    Assistant:   * No surgical staff found *    Anesthesia: General    Estimated Blood Loss (mL): less than 398     Complications: None    Specimens:   * No specimens in log *    Implants:  * No implants in log *      Drains: * No LDAs found *        Detailed Description of Procedure: With the patient in supine position, general anesthesia was administered without any complications. Patient was then placed in the dorsolithotomy position. Cord was noted protruding from the vagina. Examination under anesthesia and manual removal of placenta was performed without any difficulty. The placenta was removed in 1 piece and submitted to pathology. Pitocin was started bleeding was minimal and procedure was terminated. All instruments were removed patient was placed in supine position awake from anesthesia and transferred to recovery room in good stable condition.     Electronically signed by Julieta Shelton MD on 12/7/2021 at 7:25 AM

## 2021-12-07 NOTE — PROGRESS NOTES
Unknown Nation talked to RN Kasey Mitchell in L&D @ 2210, informed her that I or someone from anesthesia would be in L&D asap. I called staff anesthesiologist, and I proceeded to L&D.  Pt stable on arrival to O.R.

## 2021-12-07 NOTE — PROGRESS NOTES
Pt taken to LD3 for recovery period after D&C. Transported via bed. Pt groggy, but responds to commands and is oriented. Vitals are stable. O2 on at 2L via nc. RN is at the bedside. Will continue to monitor.

## 2021-12-08 VITALS
RESPIRATION RATE: 16 BRPM | WEIGHT: 145 LBS | TEMPERATURE: 97.9 F | HEART RATE: 60 BPM | BODY MASS INDEX: 23.3 KG/M2 | DIASTOLIC BLOOD PRESSURE: 77 MMHG | HEIGHT: 66 IN | OXYGEN SATURATION: 98 % | SYSTOLIC BLOOD PRESSURE: 133 MMHG

## 2021-12-08 LAB
ANTIBODY IDENTIFICATION: NORMAL
FETAL SCREEN: NORMAL
HCT VFR BLD CALC: 27 % (ref 34–48)
HEMOGLOBIN: 8.8 G/DL (ref 11.5–15.5)
RHIG LOT NUMBER: NORMAL

## 2021-12-08 PROCEDURE — 85461 HEMOGLOBIN FETAL: CPT

## 2021-12-08 PROCEDURE — 6370000000 HC RX 637 (ALT 250 FOR IP): Performed by: OBSTETRICS & GYNECOLOGY

## 2021-12-08 PROCEDURE — 85018 HEMOGLOBIN: CPT

## 2021-12-08 PROCEDURE — 85014 HEMATOCRIT: CPT

## 2021-12-08 PROCEDURE — 36415 COLL VENOUS BLD VENIPUNCTURE: CPT

## 2021-12-08 PROCEDURE — 6360000002 HC RX W HCPCS: Performed by: OBSTETRICS & GYNECOLOGY

## 2021-12-08 RX ORDER — IBUPROFEN 800 MG/1
800 TABLET ORAL EVERY 6 HOURS PRN
Qty: 120 TABLET | Refills: 0 | Status: SHIPPED | OUTPATIENT
Start: 2021-12-08

## 2021-12-08 RX ADMIN — FERROUS SULFATE TAB 325 MG (65 MG ELEMENTAL FE) 325 MG: 325 (65 FE) TAB at 08:23

## 2021-12-08 RX ADMIN — IBUPROFEN 800 MG: 800 TABLET, FILM COATED ORAL at 08:23

## 2021-12-08 RX ADMIN — HUMAN RHO(D) IMMUNE GLOBULIN 300 MCG: 300 INJECTION, SOLUTION INTRAMUSCULAR at 08:50

## 2021-12-08 RX ADMIN — DOCUSATE SODIUM 100 MG: 100 CAPSULE ORAL at 08:23

## 2021-12-08 RX ADMIN — DOCUSATE SODIUM 100 MG: 100 CAPSULE ORAL at 00:08

## 2021-12-08 ASSESSMENT — PAIN DESCRIPTION - DESCRIPTORS: DESCRIPTORS: CRAMPING

## 2021-12-08 NOTE — PROGRESS NOTES
Assumed care of patient for [id number] shift.  patient sleeping at this time call light with in reach

## 2021-12-08 NOTE — PROGRESS NOTES
CLINICAL PHARMACY NOTE: MEDS TO BEDS    Total # of Prescriptions Filled: 1   The following medications were delivered to the patient:  · IBUPROFEN 800 MG     Additional Documentation:

## 2021-12-08 NOTE — PROGRESS NOTES
Reviewed discharge instructions with pt including postpartum care. Pt verbalized understanding of instructions and follow up appointment.

## 2021-12-08 NOTE — DISCHARGE SUMMARY
Obstetrical Discharge Form         Patients Name  Charyl Bamberger    Gestational Age:  31w3d    Antepartum complications: Prolonged premature rupture of membranes    Date of Delivery:   2021       6:03 AM      Type of Delivery:   Vaginal, Spontaneous [250]   Rupture Date/time:    No data found      No data found     Presentation:    Vertex [1]   Position:                      Anesthesia:    None [250]     Feeding method:         Delivered By:   Rom ESPARZA     Baby:       Information for the patient's :  Bessie Dickerson [[id number]]          Intrapartum complications: Retained placenta  Postpartum complications: none  Discharge Date:   2021  Discharge Condition: Stable  Disposition:   Home    Plan:   Follow up    in 6 weeks

## 2021-12-08 NOTE — PROGRESS NOTES
Plan of care for night discussed with patient. Rest encouraged. Denies pain at this time.  Call light with in reach

## 2022-01-10 NOTE — DISCHARGE SUMMARY
Physician Discharge Summary     Patient ID:  Pricilla Thao  [id number]  89 y.o.  2000    Admit date: 11/25/2021    Discharge date and time: 12/2/2021  3:12 PM     Admitting Physician: Wilkins Lesches, MD     Discharge Physician: Wilkins Lesches, MD    Admission Diagnoses: Multiparity [Z64.1]  Premature rupture of membranes [O42.90]    Discharge Diagnoses: Same - left AMA    Admission Condition: fair    Discharged Condition: fair    Indication for Admission: PPROM with no prenatal care    Hospital Course: She was admitted and received steroids, ATBx and Mag sulfate    Consults: M    Significant Diagnostic Studies: radiology: Ultrasound    Outstanding Order Results     No orders found from 10/27/2021 to 11/26/2021.           Treatments: antibiotics: steroids and Mag sulfate    HOME STABLE - left AMA    Patient Instructions:   [unfilled]  Activity: activity as tolerated  Diet: regular diet  Wound Care: none needed      Signed:  Wilkins Lesches, MD  1/10/2022  8:26 AM

## 2023-06-27 ENCOUNTER — APPOINTMENT (OUTPATIENT)
Dept: GENERAL RADIOLOGY | Age: 23
End: 2023-06-27
Payer: MEDICAID

## 2023-06-27 ENCOUNTER — HOSPITAL ENCOUNTER (EMERGENCY)
Age: 23
Discharge: HOME | End: 2023-06-27
Payer: MEDICAID

## 2023-06-27 VITALS
OXYGEN SATURATION: 100 % | SYSTOLIC BLOOD PRESSURE: 130 MMHG | DIASTOLIC BLOOD PRESSURE: 66 MMHG | HEART RATE: 90 BPM | RESPIRATION RATE: 16 BRPM | TEMPERATURE: 98.3 F

## 2023-06-27 DIAGNOSIS — S61.212A LACERATION OF RIGHT MIDDLE FINGER WITHOUT FOREIGN BODY WITHOUT DAMAGE TO NAIL, INITIAL ENCOUNTER: Primary | ICD-10-CM

## 2023-06-27 PROCEDURE — 99283 EMERGENCY DEPT VISIT LOW MDM: CPT

## 2023-06-27 PROCEDURE — 73130 X-RAY EXAM OF HAND: CPT

## 2023-06-27 RX ORDER — LIDOCAINE HYDROCHLORIDE 10 MG/ML
5 INJECTION, SOLUTION INFILTRATION; PERINEURAL ONCE
Status: DISCONTINUED | OUTPATIENT
Start: 2023-06-27 | End: 2023-06-27 | Stop reason: HOSPADM

## 2023-06-27 RX ORDER — IBUPROFEN 800 MG/1
800 TABLET ORAL ONCE
Status: DISCONTINUED | OUTPATIENT
Start: 2023-06-27 | End: 2023-06-27 | Stop reason: HOSPADM

## 2023-06-27 RX ORDER — CEPHALEXIN 500 MG/1
500 CAPSULE ORAL 4 TIMES DAILY
Qty: 28 CAPSULE | Refills: 0 | Status: SHIPPED | OUTPATIENT
Start: 2023-06-27 | End: 2023-07-04

## 2023-06-27 RX ORDER — IBUPROFEN 800 MG/1
800 TABLET ORAL EVERY 6 HOURS PRN
Qty: 20 TABLET | Refills: 0 | Status: SHIPPED | OUTPATIENT
Start: 2023-06-27 | End: 2023-07-02

## 2023-06-28 ENCOUNTER — HOSPITAL ENCOUNTER (EMERGENCY)
Age: 23
Discharge: HOME | End: 2023-06-28
Attending: STUDENT IN AN ORGANIZED HEALTH CARE EDUCATION/TRAINING PROGRAM
Payer: MEDICAID

## 2023-06-28 VITALS
DIASTOLIC BLOOD PRESSURE: 82 MMHG | SYSTOLIC BLOOD PRESSURE: 130 MMHG | WEIGHT: 140 LBS | TEMPERATURE: 97.1 F | BODY MASS INDEX: 21.97 KG/M2 | OXYGEN SATURATION: 100 % | HEART RATE: 84 BPM | HEIGHT: 67 IN | RESPIRATION RATE: 16 BRPM

## 2023-06-28 DIAGNOSIS — Z48.00 DRESSING CHANGE: Primary | ICD-10-CM

## 2023-06-28 PROCEDURE — 99282 EMERGENCY DEPT VISIT SF MDM: CPT

## 2023-06-28 ASSESSMENT — ENCOUNTER SYMPTOMS: VOMITING: 0

## 2023-07-05 ENCOUNTER — HOSPITAL ENCOUNTER (EMERGENCY)
Age: 23
Discharge: HOME | End: 2023-07-05
Attending: STUDENT IN AN ORGANIZED HEALTH CARE EDUCATION/TRAINING PROGRAM
Payer: MEDICAID

## 2023-07-05 VITALS
RESPIRATION RATE: 16 BRPM | SYSTOLIC BLOOD PRESSURE: 128 MMHG | TEMPERATURE: 97.3 F | HEART RATE: 60 BPM | DIASTOLIC BLOOD PRESSURE: 90 MMHG | OXYGEN SATURATION: 100 %

## 2023-07-05 DIAGNOSIS — Z48.02 ENCOUNTER FOR REMOVAL OF SUTURES: Primary | ICD-10-CM

## 2023-07-05 PROCEDURE — 99282 EMERGENCY DEPT VISIT SF MDM: CPT

## 2023-07-05 RX ORDER — IBUPROFEN 200 MG
TABLET ORAL ONCE
Status: DISCONTINUED | OUTPATIENT
Start: 2023-07-05 | End: 2023-07-05 | Stop reason: HOSPADM

## 2023-07-05 ASSESSMENT — PAIN - FUNCTIONAL ASSESSMENT: PAIN_FUNCTIONAL_ASSESSMENT: NONE - DENIES PAIN

## 2023-07-05 NOTE — ED PROVIDER NOTES
follow-up. New Prescriptions    No medications on file       Diagnosis:  1. Encounter for removal of sutures        Disposition:  Patient's disposition: Discharge to home  Patient's condition is stable.        Jose Corrales DO  07/05/23 1022

## 2025-01-18 ENCOUNTER — HOSPITAL ENCOUNTER (EMERGENCY)
Age: 25
Discharge: HOME | End: 2025-01-18
Attending: EMERGENCY MEDICINE
Payer: MEDICAID

## 2025-01-18 VITALS
TEMPERATURE: 98.1 F | HEIGHT: 60 IN | SYSTOLIC BLOOD PRESSURE: 126 MMHG | WEIGHT: 145 LBS | OXYGEN SATURATION: 99 % | DIASTOLIC BLOOD PRESSURE: 76 MMHG | RESPIRATION RATE: 16 BRPM | BODY MASS INDEX: 28.47 KG/M2 | HEART RATE: 78 BPM

## 2025-01-18 DIAGNOSIS — R30.0 DYSURIA: Primary | ICD-10-CM

## 2025-01-18 LAB
BACTERIA URNS QL MICRO: ABNORMAL
BILIRUB UR QL STRIP: NEGATIVE
CLARITY UR: CLEAR
COLOR UR: YELLOW
EPI CELLS #/AREA URNS HPF: ABNORMAL /HPF
GLUCOSE UR STRIP-MCNC: NEGATIVE MG/DL
HCG UR QL: NEGATIVE
HGB UR QL STRIP.AUTO: ABNORMAL
KETONES UR STRIP-MCNC: NEGATIVE MG/DL
LEUKOCYTE ESTERASE UR QL STRIP: ABNORMAL
NITRITE UR QL STRIP: NEGATIVE
PH UR STRIP: 7 [PH] (ref 5–9)
PROT UR STRIP-MCNC: NEGATIVE MG/DL
RBC #/AREA URNS HPF: ABNORMAL /HPF
SP GR UR STRIP: 1.02 (ref 1–1.03)
UROBILINOGEN UR STRIP-ACNC: 0.2 EU/DL (ref 0–1)
WBC #/AREA URNS HPF: ABNORMAL /HPF

## 2025-01-18 PROCEDURE — 84703 CHORIONIC GONADOTROPIN ASSAY: CPT

## 2025-01-18 PROCEDURE — 99283 EMERGENCY DEPT VISIT LOW MDM: CPT

## 2025-01-18 PROCEDURE — 81001 URINALYSIS AUTO W/SCOPE: CPT

## 2025-01-18 RX ORDER — PHENAZOPYRIDINE HYDROCHLORIDE 100 MG/1
100 TABLET, FILM COATED ORAL 3 TIMES DAILY PRN
Qty: 6 TABLET | Refills: 0 | Status: SHIPPED | OUTPATIENT
Start: 2025-01-18 | End: 2025-01-21

## 2025-01-18 RX ORDER — NITROFURANTOIN 25; 75 MG/1; MG/1
100 CAPSULE ORAL 2 TIMES DAILY
Qty: 20 CAPSULE | Refills: 0 | Status: SHIPPED | OUTPATIENT
Start: 2025-01-18 | End: 2025-01-28

## 2025-01-18 ASSESSMENT — ENCOUNTER SYMPTOMS
SHORTNESS OF BREATH: 0
EYE DISCHARGE: 0
SORE THROAT: 0
ABDOMINAL DISTENTION: 0
SINUS PRESSURE: 0
COUGH: 0
NAUSEA: 0
BACK PAIN: 0
VOMITING: 0
DIARRHEA: 0
EYE PAIN: 0
EYE REDNESS: 0
WHEEZING: 0

## 2025-01-18 ASSESSMENT — PAIN - FUNCTIONAL ASSESSMENT: PAIN_FUNCTIONAL_ASSESSMENT: 0-10

## 2025-01-18 ASSESSMENT — PAIN SCALES - GENERAL: PAINLEVEL_OUTOF10: 10

## 2025-01-18 ASSESSMENT — PAIN DESCRIPTION - DIRECTION: RADIATING_TOWARDS: WHEN URINATING

## 2025-01-18 NOTE — ED PROVIDER NOTES
The history is provided by the patient.   Dysuria   This is a new problem. The current episode started more than 1 week ago. The problem occurs every urination. The pain is mild. There has been no fever. Associated symptoms include frequency and urgency. Pertinent negatives include no chills, no nausea, no vomiting, no discharge and no hematuria.        Review of Systems   Constitutional:  Negative for chills and fever.   HENT:  Negative for ear pain, sinus pressure and sore throat.    Eyes:  Negative for pain, discharge and redness.   Respiratory:  Negative for cough, shortness of breath and wheezing.    Cardiovascular:  Negative for chest pain.   Gastrointestinal:  Negative for abdominal distention, diarrhea, nausea and vomiting.   Genitourinary:  Positive for dysuria, frequency and urgency. Negative for hematuria.   Musculoskeletal:  Negative for arthralgias and back pain.   Skin:  Negative for rash and wound.   Neurological:  Negative for weakness and headaches.   Hematological:  Negative for adenopathy.   All other systems reviewed and are negative.       Physical Exam  Vitals and nursing note reviewed.   Constitutional:       Appearance: She is well-developed.   HENT:      Head: Normocephalic and atraumatic.      Right Ear: Tympanic membrane normal.      Left Ear: Tympanic membrane normal.      Nose: Nose normal.   Eyes:      Pupils: Pupils are equal, round, and reactive to light.   Cardiovascular:      Rate and Rhythm: Normal rate and regular rhythm.      Heart sounds: Normal heart sounds. No murmur heard.  Pulmonary:      Effort: Pulmonary effort is normal. No respiratory distress.      Breath sounds: Normal breath sounds. No wheezing or rales.   Abdominal:      General: Bowel sounds are normal.      Palpations: Abdomen is soft.      Tenderness: There is no abdominal tenderness. There is no guarding or rebound.   Musculoskeletal:      Cervical back: Normal range of motion and neck supple.   Skin:      REVIEWED ---------------------------  Date / Time Roomed:  1/18/2025 12:48 PM  ED Bed Assignment:  02/02    The nursing notes within the ED encounter and vital signs as below have been reviewed.   /76   Pulse 78   Temp 98.1 °F (36.7 °C) (Temporal)   Resp 16   Ht 1.524 m (5')   Wt 65.8 kg (145 lb)   LMP 12/18/2024   SpO2 99%   BMI 28.32 kg/m²   Oxygen Saturation Interpretation: Normal      ------------------------------------------ PROGRESS NOTES ------------------------------------------  I have spoken with the patient and discussed today’s results, in addition to providing specific details for the plan of care and counseling regarding the diagnosis and prognosis.  Their questions are answered at this time and they are agreeable with the plan. I discussed at length with them reasons for immediate return here for re evaluation. They will followup with primary care by calling their office tomorrow.    Medications - No data to display      --------------------------------- ADDITIONAL PROVIDER NOTES ---------------------------------  At this time the patient is without objective evidence of an acute process requiring hospitalization or inpatient management.  They have remained hemodynamically stable throughout their entire ED visit and are stable for discharge with outpatient follow-up.     The plan has been discussed in detail and they are aware of the specific conditions for emergent return, as well as the importance of follow-up.      New Prescriptions    NITROFURANTOIN, MACROCRYSTAL-MONOHYDRATE, (MACROBID) 100 MG CAPSULE    Take 1 capsule by mouth 2 times daily for 10 days    PHENAZOPYRIDINE (PYRIDIUM) 100 MG TABLET    Take 1 tablet by mouth 3 times daily as needed for Pain       Diagnosis:  1. Dysuria        Disposition:  Patient's disposition: Discharge to home  Patient's condition is stable.                    Cora Saha MD  01/18/25 1088

## 2025-02-08 LAB — COMMENT: NORMAL

## 2025-07-12 ENCOUNTER — HOSPITAL ENCOUNTER (EMERGENCY)
Age: 25
Discharge: HOME | End: 2025-07-12
Attending: FAMILY MEDICINE
Payer: COMMERCIAL

## 2025-07-12 VITALS
HEART RATE: 76 BPM | OXYGEN SATURATION: 100 % | RESPIRATION RATE: 16 BRPM | TEMPERATURE: 98.9 F | DIASTOLIC BLOOD PRESSURE: 82 MMHG | SYSTOLIC BLOOD PRESSURE: 136 MMHG

## 2025-07-12 DIAGNOSIS — S50.812A ABRASION OF LEFT FOREARM, INITIAL ENCOUNTER: Primary | ICD-10-CM

## 2025-07-12 PROCEDURE — 99282 EMERGENCY DEPT VISIT SF MDM: CPT

## 2025-07-12 ASSESSMENT — PAIN DESCRIPTION - ORIENTATION: ORIENTATION: LEFT

## 2025-07-12 ASSESSMENT — PAIN SCALES - GENERAL: PAINLEVEL_OUTOF10: 10

## 2025-07-12 ASSESSMENT — PAIN - FUNCTIONAL ASSESSMENT
PAIN_FUNCTIONAL_ASSESSMENT: 0-10
PAIN_FUNCTIONAL_ASSESSMENT: ACTIVITIES ARE NOT PREVENTED

## 2025-07-12 ASSESSMENT — PAIN DESCRIPTION - DESCRIPTORS: DESCRIPTORS: BURNING

## 2025-07-12 ASSESSMENT — PAIN DESCRIPTION - LOCATION: LOCATION: ARM

## 2025-07-12 ASSESSMENT — PAIN DESCRIPTION - PAIN TYPE: TYPE: ACUTE PAIN

## 2025-07-12 NOTE — ED PROVIDER NOTES
HPI:  25,   Time: 3:14 PM EDT         Cecelia Trinidad is a 25 y.o. female presenting to the ED for injury to the left forearm that occurred at home just prior to arrival when she punched a glass ON [employer] tank, the glass broke and caused has several abrasions/lacerations of the left forearm.    ROS:   Pertinent positives and negatives are stated within HPI, all other systems reviewed and are negative.  --------------------------------------------- PAST HISTORY ---------------------------------------------  Past Medical History:  has a past medical history of Retained complete placenta and  (spontaneous vaginal delivery).    Past Surgical History:  has a past surgical history that includes Dilation and curettage of uterus (N/A, 2021).    Social History:  reports that she has quit smoking. Her smoking use included cigarettes. She has never used smokeless tobacco. She reports that she does not drink alcohol and does not use drugs.    Family History: family history is not on file.     The patient’s home medications have been reviewed.    Allergies: Patient has no known allergies.    -------------------------------------------------- RESULTS -------------------------------------------------  All laboratory and radiology results have been personally reviewed by myself   LABS:  No results found for this visit on 25.    RADIOLOGY:  Interpreted by Radiologist.  No orders to display       ------------------------- NURSING NOTES AND VITALS REVIEWED ---------------------------   The nursing notes within the ED encounter and vital signs as below have been reviewed.   /82   Pulse 76   Temp 98.9 °F (37.2 °C) (Infrared)   Resp 16   LMP 2025   SpO2 100%   Oxygen Saturation Interpretation: Normal      ---------------------------------------------------PHYSICAL EXAM--------------------------------------    Constitutional/General: Alert and oriented x3, well appearing, non toxic in NAD  Head:

## 2025-07-23 ENCOUNTER — HOSPITAL ENCOUNTER (EMERGENCY)
Age: 25
Discharge: HOME | End: 2025-07-23
Payer: COMMERCIAL

## 2025-07-23 ENCOUNTER — APPOINTMENT (OUTPATIENT)
Dept: CT IMAGING | Age: 25
End: 2025-07-23
Payer: COMMERCIAL

## 2025-07-23 VITALS — TEMPERATURE: 97.2 F | RESPIRATION RATE: 18 BRPM | HEART RATE: 85 BPM | OXYGEN SATURATION: 99 %

## 2025-07-23 DIAGNOSIS — J02.0 STREP PHARYNGITIS: Primary | ICD-10-CM

## 2025-07-23 DIAGNOSIS — J03.00 ACUTE NON-RECURRENT STREPTOCOCCAL TONSILLITIS: ICD-10-CM

## 2025-07-23 LAB
ANION GAP SERPL CALCULATED.3IONS-SCNC: 10 MMOL/L (ref 7–16)
BASOPHILS # BLD: 0.02 K/UL (ref 0–0.2)
BASOPHILS NFR BLD: 0 % (ref 0–2)
BUN SERPL-MCNC: 10 MG/DL (ref 6–20)
CALCIUM SERPL-MCNC: 8.7 MG/DL (ref 8.6–10)
CHLORIDE SERPL-SCNC: 106 MMOL/L (ref 98–107)
CO2 SERPL-SCNC: 22 MMOL/L (ref 22–29)
CREAT SERPL-MCNC: 0.8 MG/DL (ref 0.5–1)
EOSINOPHIL # BLD: 0.12 K/UL (ref 0.05–0.5)
EOSINOPHILS RELATIVE PERCENT: 2 % (ref 0–6)
ERYTHROCYTE [DISTWIDTH] IN BLOOD BY AUTOMATED COUNT: 18.2 % (ref 11.5–15)
GFR, ESTIMATED: >90 ML/MIN/1.73M2
GLUCOSE SERPL-MCNC: 95 MG/DL (ref 74–99)
HCT VFR BLD AUTO: 31.5 % (ref 34–48)
HETEROPH AB BLD QL IA: NEGATIVE
HGB BLD-MCNC: 9.8 G/DL (ref 11.5–15.5)
IMM GRANULOCYTES # BLD AUTO: <0.03 K/UL (ref 0–0.58)
IMM GRANULOCYTES NFR BLD: 0 % (ref 0–5)
LYMPHOCYTES NFR BLD: 2.33 K/UL (ref 1.5–4)
LYMPHOCYTES RELATIVE PERCENT: 38 % (ref 20–42)
MAGNESIUM SERPL-MCNC: 1.8 MG/DL (ref 1.6–2.6)
MCH RBC QN AUTO: 22.3 PG (ref 26–35)
MCHC RBC AUTO-ENTMCNC: 31.1 G/DL (ref 32–34.5)
MCV RBC AUTO: 71.8 FL (ref 80–99.9)
MONOCYTES NFR BLD: 0.58 K/UL (ref 0.1–0.95)
MONOCYTES NFR BLD: 9 % (ref 2–12)
NEUTROPHILS NFR BLD: 51 % (ref 43–80)
NEUTS SEG NFR BLD: 3.13 K/UL (ref 1.8–7.3)
PLATELET # BLD AUTO: 249 K/UL (ref 130–450)
PMV BLD AUTO: 10.5 FL (ref 7–12)
POTASSIUM SERPL-SCNC: 3.5 MMOL/L (ref 3.5–5.1)
RBC # BLD AUTO: 4.39 M/UL (ref 3.5–5.5)
SODIUM SERPL-SCNC: 138 MMOL/L (ref 136–145)
SPECIMEN SOURCE: ABNORMAL
STREP A, MOLECULAR: POSITIVE
WBC OTHER # BLD: 6.2 K/UL (ref 4.5–11.5)

## 2025-07-23 PROCEDURE — 96375 TX/PRO/DX INJ NEW DRUG ADDON: CPT

## 2025-07-23 PROCEDURE — 2580000003 HC RX 258: Performed by: PHYSICIAN ASSISTANT

## 2025-07-23 PROCEDURE — 6360000004 HC RX CONTRAST MEDICATION: Performed by: RADIOLOGY

## 2025-07-23 PROCEDURE — 85025 COMPLETE CBC W/AUTO DIFF WBC: CPT

## 2025-07-23 PROCEDURE — 6360000002 HC RX W HCPCS: Performed by: PHYSICIAN ASSISTANT

## 2025-07-23 PROCEDURE — 70491 CT SOFT TISSUE NECK W/DYE: CPT

## 2025-07-23 PROCEDURE — 96365 THER/PROPH/DIAG IV INF INIT: CPT

## 2025-07-23 PROCEDURE — 87651 STREP A DNA AMP PROBE: CPT

## 2025-07-23 PROCEDURE — 86308 HETEROPHILE ANTIBODY SCREEN: CPT

## 2025-07-23 PROCEDURE — 99285 EMERGENCY DEPT VISIT HI MDM: CPT

## 2025-07-23 PROCEDURE — 80048 BASIC METABOLIC PNL TOTAL CA: CPT

## 2025-07-23 PROCEDURE — 83735 ASSAY OF MAGNESIUM: CPT

## 2025-07-23 RX ORDER — KETOROLAC TROMETHAMINE 30 MG/ML
30 INJECTION, SOLUTION INTRAMUSCULAR; INTRAVENOUS ONCE
Status: COMPLETED | OUTPATIENT
Start: 2025-07-23 | End: 2025-07-23

## 2025-07-23 RX ORDER — IOPAMIDOL 755 MG/ML
75 INJECTION, SOLUTION INTRAVASCULAR
Status: COMPLETED | OUTPATIENT
Start: 2025-07-23 | End: 2025-07-23

## 2025-07-23 RX ORDER — PREDNISONE 10 MG/1
40 TABLET ORAL DAILY
Qty: 16 TABLET | Refills: 0 | Status: SHIPPED | OUTPATIENT
Start: 2025-07-22 | End: 2025-07-26

## 2025-07-23 RX ORDER — IBUPROFEN 800 MG/1
800 TABLET, FILM COATED ORAL EVERY 8 HOURS PRN
Qty: 21 TABLET | Refills: 0 | Status: SHIPPED | OUTPATIENT
Start: 2025-07-23 | End: 2025-07-30

## 2025-07-23 RX ORDER — DEXAMETHASONE SODIUM PHOSPHATE 10 MG/ML
10 INJECTION, SOLUTION INTRA-ARTICULAR; INTRALESIONAL; INTRAMUSCULAR; INTRAVENOUS; SOFT TISSUE ONCE
Status: COMPLETED | OUTPATIENT
Start: 2025-07-23 | End: 2025-07-23

## 2025-07-23 RX ADMIN — KETOROLAC TROMETHAMINE 30 MG: 30 INJECTION, SOLUTION INTRAMUSCULAR at 13:07

## 2025-07-23 RX ADMIN — DEXAMETHASONE SODIUM PHOSPHATE 10 MG: 10 INJECTION INTRAMUSCULAR; INTRAVENOUS at 13:07

## 2025-07-23 RX ADMIN — SODIUM CHLORIDE 3000 MG: 9 INJECTION, SOLUTION INTRAVENOUS at 13:15

## 2025-07-23 RX ADMIN — IOPAMIDOL 75 ML: 755 INJECTION, SOLUTION INTRAVENOUS at 13:56

## 2025-07-23 ASSESSMENT — LIFESTYLE VARIABLES
HOW MANY STANDARD DRINKS CONTAINING ALCOHOL DO YOU HAVE ON A TYPICAL DAY: 3 OR 4
HOW OFTEN DO YOU HAVE A DRINK CONTAINING ALCOHOL: MONTHLY OR LESS

## 2025-07-23 NOTE — ED PROVIDER NOTES
Independent EVY Visit.    HPI:  25,   Time: 12:28 PM EDT         Cecelia Trinidad is a 25 y.o. female presenting to the ED for sore throat, beginning 1 week ago.  The complaint has been persistent, moderate in severity, and worsened by swallowing.       Patient comes in with complaint of sore throat that swollen.  She states that started approximately a week ago.  She is able to maintain her secretions but has painful swallowing.  She denies any fever or chills.  No runny nose congestion cough or chest pain present.  ROS:   Pertinent positives and negatives are stated within HPI, all other systems reviewed and are negative.  --------------------------------------------- PAST HISTORY ---------------------------------------------  Past Medical History:  has a past medical history of Retained complete placenta and  (spontaneous vaginal delivery).    Past Surgical History:  has a past surgical history that includes Dilation and curettage of uterus (N/A, 2021).    Social History:  reports that she has quit smoking. Her smoking use included cigarettes. She has never used smokeless tobacco. She reports that she does not drink alcohol and does not use drugs.    Family History: family history is not on file.     The patient’s home medications have been reviewed.    Allergies: Patient has no known allergies.    -------------------------------------------------- RESULTS -------------------------------------------------  All laboratory and radiology results have been personally reviewed by myself   LABS:  Results for orders placed or performed during the hospital encounter of 25   Rapid Strep Screen    Specimen: Throat   Result Value Ref Range    Source .THROAT SWAB     Strep A, Molecular POSITIVE (A) NEGATIVE   CBC with Auto Differential   Result Value Ref Range    WBC 6.2 4.5 - 11.5 k/uL    RBC 4.39 3.50 - 5.50 m/uL    Hemoglobin 9.8 (L) 11.5 - 15.5 g/dL    Hematocrit 31.5 (L) 34.0 - 48.0 %    MCV 71.8 (L)

## 2025-07-31 ENCOUNTER — APPOINTMENT (OUTPATIENT)
Dept: CT IMAGING | Age: 25
End: 2025-07-31
Payer: COMMERCIAL

## 2025-07-31 ENCOUNTER — HOSPITAL ENCOUNTER (EMERGENCY)
Age: 25
Discharge: HOME | End: 2025-07-31
Attending: STUDENT IN AN ORGANIZED HEALTH CARE EDUCATION/TRAINING PROGRAM
Payer: COMMERCIAL

## 2025-07-31 VITALS
RESPIRATION RATE: 19 BRPM | DIASTOLIC BLOOD PRESSURE: 96 MMHG | SYSTOLIC BLOOD PRESSURE: 149 MMHG | HEART RATE: 83 BPM | TEMPERATURE: 99.3 F | OXYGEN SATURATION: 100 %

## 2025-07-31 DIAGNOSIS — J36 PERITONSILLAR ABSCESS: ICD-10-CM

## 2025-07-31 DIAGNOSIS — J02.0 STREP PHARYNGITIS: Primary | ICD-10-CM

## 2025-07-31 LAB
ALBUMIN SERPL-MCNC: 3.9 G/DL (ref 3.5–5.2)
ALP SERPL-CCNC: 71 U/L (ref 35–104)
ALT SERPL-CCNC: 10 U/L (ref 0–35)
ANION GAP SERPL CALCULATED.3IONS-SCNC: 10 MMOL/L (ref 7–16)
AST SERPL-CCNC: 18 U/L (ref 0–35)
BASOPHILS # BLD: 0.02 K/UL (ref 0–0.2)
BASOPHILS NFR BLD: 0 % (ref 0–2)
BILIRUB SERPL-MCNC: 0.4 MG/DL (ref 0–1.2)
BUN SERPL-MCNC: 10 MG/DL (ref 6–20)
CALCIUM SERPL-MCNC: 8.7 MG/DL (ref 8.6–10)
CHLORIDE SERPL-SCNC: 105 MMOL/L (ref 98–107)
CO2 SERPL-SCNC: 21 MMOL/L (ref 22–29)
CREAT SERPL-MCNC: 0.7 MG/DL (ref 0.5–1)
EOSINOPHIL # BLD: 0.11 K/UL (ref 0.05–0.5)
EOSINOPHILS RELATIVE PERCENT: 1 % (ref 0–6)
ERYTHROCYTE [DISTWIDTH] IN BLOOD BY AUTOMATED COUNT: 17.2 % (ref 11.5–15)
GFR, ESTIMATED: >90 ML/MIN/1.73M2
GLUCOSE SERPL-MCNC: 93 MG/DL (ref 74–99)
HCG, URINE, POC: NEGATIVE
HCT VFR BLD AUTO: 32 % (ref 34–48)
HGB BLD-MCNC: 9.9 G/DL (ref 11.5–15.5)
IMM GRANULOCYTES # BLD AUTO: 0.03 K/UL (ref 0–0.58)
IMM GRANULOCYTES NFR BLD: 0 % (ref 0–5)
LYMPHOCYTES NFR BLD: 2.32 K/UL (ref 1.5–4)
LYMPHOCYTES RELATIVE PERCENT: 30 % (ref 20–42)
Lab: NORMAL
MCH RBC QN AUTO: 21.8 PG (ref 26–35)
MCHC RBC AUTO-ENTMCNC: 30.9 G/DL (ref 32–34.5)
MCV RBC AUTO: 70.3 FL (ref 80–99.9)
MONOCYTES NFR BLD: 0.82 K/UL (ref 0.1–0.95)
MONOCYTES NFR BLD: 11 % (ref 2–12)
NEGATIVE QC PASS/FAIL: NORMAL
NEUTROPHILS NFR BLD: 57 % (ref 43–80)
NEUTS SEG NFR BLD: 4.42 K/UL (ref 1.8–7.3)
PLATELET # BLD AUTO: 219 K/UL (ref 130–450)
PMV BLD AUTO: 11.3 FL (ref 7–12)
POSITIVE QC PASS/FAIL: NORMAL
POTASSIUM SERPL-SCNC: 3.8 MMOL/L (ref 3.5–5.1)
PROT SERPL-MCNC: 7.6 G/DL (ref 6.4–8.3)
RBC # BLD AUTO: 4.55 M/UL (ref 3.5–5.5)
SODIUM SERPL-SCNC: 137 MMOL/L (ref 136–145)
WBC OTHER # BLD: 7.7 K/UL (ref 4.5–11.5)

## 2025-07-31 PROCEDURE — 6360000004 HC RX CONTRAST MEDICATION: Performed by: RADIOLOGY

## 2025-07-31 PROCEDURE — 85025 COMPLETE CBC W/AUTO DIFF WBC: CPT

## 2025-07-31 PROCEDURE — 70491 CT SOFT TISSUE NECK W/DYE: CPT

## 2025-07-31 PROCEDURE — 6360000002 HC RX W HCPCS: Performed by: STUDENT IN AN ORGANIZED HEALTH CARE EDUCATION/TRAINING PROGRAM

## 2025-07-31 PROCEDURE — 80053 COMPREHEN METABOLIC PANEL: CPT

## 2025-07-31 PROCEDURE — 99285 EMERGENCY DEPT VISIT HI MDM: CPT

## 2025-07-31 PROCEDURE — 2580000003 HC RX 258: Performed by: STUDENT IN AN ORGANIZED HEALTH CARE EDUCATION/TRAINING PROGRAM

## 2025-07-31 RX ORDER — DEXAMETHASONE SODIUM PHOSPHATE 10 MG/ML
10 INJECTION, SOLUTION INTRA-ARTICULAR; INTRALESIONAL; INTRAMUSCULAR; INTRAVENOUS; SOFT TISSUE ONCE
Status: COMPLETED | OUTPATIENT
Start: 2025-07-31 | End: 2025-07-31

## 2025-07-31 RX ORDER — LIDOCAINE HYDROCHLORIDE AND EPINEPHRINE 10; 10 MG/ML; UG/ML
20 INJECTION, SOLUTION INFILTRATION; PERINEURAL ONCE
Status: DISCONTINUED | OUTPATIENT
Start: 2025-07-31 | End: 2025-07-31 | Stop reason: HOSPADM

## 2025-07-31 RX ORDER — LIDOCAINE HYDROCHLORIDE 20 MG/ML
5 SOLUTION OROPHARYNGEAL
Qty: 100 ML | Refills: 0 | Status: SHIPPED | OUTPATIENT
Start: 2025-07-31

## 2025-07-31 RX ORDER — PREDNISONE 20 MG/1
40 TABLET ORAL DAILY
Qty: 10 TABLET | Refills: 0 | Status: SHIPPED | OUTPATIENT
Start: 2025-07-31 | End: 2025-08-05

## 2025-07-31 RX ORDER — CLINDAMYCIN HYDROCHLORIDE 300 MG/1
300 CAPSULE ORAL 3 TIMES DAILY
Qty: 30 CAPSULE | Refills: 0 | Status: SHIPPED | OUTPATIENT
Start: 2025-07-31 | End: 2025-08-10

## 2025-07-31 RX ORDER — IOPAMIDOL 755 MG/ML
75 INJECTION, SOLUTION INTRAVASCULAR
Status: COMPLETED | OUTPATIENT
Start: 2025-07-31 | End: 2025-07-31

## 2025-07-31 RX ORDER — KETOROLAC TROMETHAMINE 30 MG/ML
15 INJECTION, SOLUTION INTRAMUSCULAR; INTRAVENOUS ONCE
Status: COMPLETED | OUTPATIENT
Start: 2025-07-31 | End: 2025-07-31

## 2025-07-31 RX ADMIN — SODIUM CHLORIDE 3000 MG: 9 INJECTION, SOLUTION INTRAVENOUS at 13:16

## 2025-07-31 RX ADMIN — DEXAMETHASONE SODIUM PHOSPHATE 10 MG: 10 INJECTION INTRAMUSCULAR; INTRAVENOUS at 09:31

## 2025-07-31 RX ADMIN — KETOROLAC TROMETHAMINE 15 MG: 30 INJECTION, SOLUTION INTRAMUSCULAR at 09:31

## 2025-07-31 RX ADMIN — IOPAMIDOL 70 ML: 755 INJECTION, SOLUTION INTRAVENOUS at 12:05

## 2025-07-31 ASSESSMENT — ENCOUNTER SYMPTOMS
SORE THROAT: 1
EYES NEGATIVE: 1
VOICE CHANGE: 0
SINUS PRESSURE: 0
RESPIRATORY NEGATIVE: 1
RHINORRHEA: 0
GASTROINTESTINAL NEGATIVE: 1
TROUBLE SWALLOWING: 1
SINUS PAIN: 0

## 2025-07-31 NOTE — ED PROVIDER NOTES
[facility] EMERGENCY DEPARTMENT  EMERGENCY DEPARTMENT ENCOUNTER        Pt Name: Cecelia Trinidad  MRN: [id number]  Birthdate 2000  Date of evaluation: 7/31/2025  Provider: Ana Luna DO  PCP: No primary care provider on file.  Note Started: 1:27 PM EDT 7/31/25    CHIEF COMPLAINT       Chief Complaint   Patient presents with    Pharyngitis     Was seen here last week and dx with strep, states felt better for only brief time and now pain worse on left side of neck, hurts to swallow/drink/eat. Left bottom tooth ache and now headache from the neck per patient. Denies fever       HISTORY OF PRESENT ILLNESS: 1 or more Elements     Limitations to history : None    Cecelia Trinidad is a 25 y.o. female who presents to the emergency department for evaluation of sore throat.  Patient states that she was recently diagnosed with strep pharyngitis and is still taking Augmentin for this, was also reportedly put on steroids.  States that the symptoms improved for a very short period of time after treatment but now are worsening over the past several days.  States that the pain is worse at the left side of her throat, it hurts to swallow.  States that she has some pain to her left lower teeth as well and a headache.  She denies any fevers.  She denies any neck pain or stiffness.  She has not had any abdominal pain nausea vomiting diarrhea or abnormal urinary symptoms..        Nursing Notes were all reviewed and agreed with or any disagreements were addressed in the HPI.      REVIEW OF EXTERNAL NOTE :       Reviewed documentation from emergency department encounter 7/23/2025.    Chart Review/External Note Review    Last Echo reviewed by Me:  No results found for: \"LVEF\", \"LVEFMODE\"        Controlled Substance Monitoring:    Acute and Chronic Pain Monitoring:        No data to display                      REVIEW OF SYSTEMS :      Review of Systems   Constitutional:  Negative for chills and fever. 
[place]

## 2025-07-31 NOTE — DISCHARGE INSTR - COC
Continuity of Care Form    Patient Name: Cecelia Trinidad   :  2000  MRN:  [id number]    Admit date:  2025  Discharge date:  ***    Code Status Order: Prior   Advance Directives:     Admitting Physician:  No admitting provider for patient encounter.  PCP: No primary care provider on file.    Discharging Nurse: ***  Discharging Hospital Unit/Room#:   Discharging Unit Phone Number: ***    Emergency Contact:   Extended Emergency Contact Information  Primary Emergency Contact: Griselda Trinidad  Address: [address] [place] [address] [address]           [place], [address]  Home Phone: [phone]  Mobile Phone: [phone]  Relation: Parent  Hearing or visual needs: None  Other needs: None  Preferred language: English   needed? No    Past Surgical History:  Past Surgical History:   Procedure Laterality Date    DILATION AND CURETTAGE OF UTERUS N/A 2021    DILATATION AND CURETTAGE SUCTION performed by Dayanara Fagan MD at [facility] L&D [facility]       Immunization History:   Immunization History   Administered Date(s) Administered    TDaP, ADACEL (age 10y-64y), BOOSTRIX (age 10y+), IM, 0.5mL 2019       Active Problems:  Patient Active Problem List   Diagnosis Code    Multiparity Z64.1    Premature rupture of membranes O42.90     premature rupture of membranes (PPROM) with onset of labor after 24 hours of rupture in third trimester, antepartum O42.113    No prenatal care in current pregnancy in third trimester O09.33    H/O  delivery, currently pregnant O09.899    Pregnancy as incidental finding Z33.1     (spontaneous vaginal delivery) O80    Retained complete placenta O73.0       Isolation/Infection:   Isolation            No Isolation          Patient Infection Status    None to display         Nurse Assessment:  Last Vital Signs: BP (!) 149/96   Pulse 83   Temp 99.3 °F (37.4 °C) (Oral)   Resp 19   LMP 2025 (Approximate)   SpO2 100%     Last documented pain score  SECTION    Inpatient Status Date: ***    Readmission Risk Assessment Score:  [facility] RISK OF UNPLANNED READMISSION 2.0             0 Total Score        Discharging to Facility/ Agency   Name:   Address:  Phone:  Fax:    Dialysis Facility (if applicable)   Name:  Address:  Dialysis Schedule:  Phone:  Fax:    / signature: {Esignature:034001600}    PHYSICIAN SECTION    Prognosis: {Prognosis:0802265734}    Condition at Discharge: { Patient Condition:610370836}    Rehab Potential (if transferring to Rehab): {Prognosis:6017292876}    Recommended Labs or Other Treatments After Discharge: ***    Physician Certification: I certify the above information and transfer of Cecelia Trinidad  is necessary for the continuing treatment of the diagnosis listed and that she requires {Admit to Appropriate Level of Care:44311} for {GREATER/LESS:221639688} 30 days.     Update Admission H&P: {CHP DME Changes in HandP:442112307}    PHYSICIAN SIGNATURE:  {Esignature:870127013}

## 2025-07-31 NOTE — DISCHARGE INSTRUCTIONS
Please return to the ER for any new or worsening symptoms  If prescribed, please be sure to  your prescriptions from the pharmacy  Please follow-up with Ear, nose, throat as instructed      CT SOFT TISSUE NECK W CONTRAST   Final Result   1. Asymmetric enlargement of the left oropharyngeal tonsil with a 1.2 x 0.7 x 0.8 cm focal hypodensity concerning for a small tonsillar or peritonsillar abscess.   2. Cervical adenopathy, with largest node measuring 1.3 x 1.0 cm.   3. Dental disease with cavities and potential periapical abscesses at the most posterior maxillary molars bilaterally.

## 2025-07-31 NOTE — CONSULTS
OTOLARYNGOLOGY  CONSULT NOTE  2025    Physician Consulted: Dr. Contreras  Reason for Consult: L PTA      HPI  Cecelia Trinidad is a 25 y.o. female who ENT was consulted for evaluation of left sided PTA. Reports sore throat started last week. Was subsequently put on Augmentin which she is still in the middle of her course. Admitted to the ED with lack of resolution of symptoms. Since admission to the ED has received Unasyn, decadron and toradol.    Review of Systems   Constitutional:  Negative for activity change, chills, fatigue and fever.   HENT:  Positive for sore throat and trouble swallowing (pain). Negative for congestion, ear discharge, ear pain, nosebleeds, postnasal drip, rhinorrhea, sinus pressure, sinus pain, tinnitus and voice change.    Eyes: Negative.    Respiratory: Negative.     Cardiovascular: Negative.    Gastrointestinal: Negative.        Past Medical History:   Diagnosis Date    Retained complete placenta 2021     (spontaneous vaginal delivery) 2021       Past Surgical History:   Procedure Laterality Date    DILATION AND CURETTAGE OF UTERUS N/A 2021    DILATATION AND CURETTAGE SUCTION performed by Dayanara Fagan MD at [facility] L&D OR       Medications Prior to Admission:    Prior to Admission medications    Medication Sig Start Date End Date Taking? Authorizing Provider   amoxicillin-clavulanate (AUGMENTIN) 875-125 MG per tablet Take 1 tablet by mouth 2 times daily for 10 days 25  Brenda Duran PA   ibuprofen (IBU) 800 MG tablet Take 1 tablet by mouth every 8 hours as needed for Pain 25  Brenda Duran PA       No Known Allergies    History reviewed. No pertinent family history.    Social History     Tobacco Use    Smoking status: Former     Types: Cigarettes    Smokeless tobacco: Never   Vaping Use    Vaping status: Never Used   Substance Use Topics    Alcohol use: Never    Drug use: Never           PHYSICAL EXAM:    Vitals:    25 0804

## 2025-08-02 ASSESSMENT — ENCOUNTER SYMPTOMS
SHORTNESS OF BREATH: 0
SORE THROAT: 1
COUGH: 0
TROUBLE SWALLOWING: 1

## 2025-08-20 ENCOUNTER — HOSPITAL ENCOUNTER (EMERGENCY)
Age: 25
Discharge: HOME | End: 2025-08-20
Attending: STUDENT IN AN ORGANIZED HEALTH CARE EDUCATION/TRAINING PROGRAM
Payer: COMMERCIAL

## 2025-08-20 ENCOUNTER — APPOINTMENT (OUTPATIENT)
Dept: CT IMAGING | Age: 25
End: 2025-08-20
Payer: COMMERCIAL

## 2025-08-20 VITALS
TEMPERATURE: 97.5 F | OXYGEN SATURATION: 98 % | BODY MASS INDEX: 29.29 KG/M2 | HEART RATE: 97 BPM | SYSTOLIC BLOOD PRESSURE: 130 MMHG | WEIGHT: 150 LBS | RESPIRATION RATE: 18 BRPM | DIASTOLIC BLOOD PRESSURE: 83 MMHG

## 2025-08-20 DIAGNOSIS — R10.84 GENERALIZED ABDOMINAL PAIN: Primary | ICD-10-CM

## 2025-08-20 DIAGNOSIS — R19.7 DIARRHEA, UNSPECIFIED TYPE: ICD-10-CM

## 2025-08-20 DIAGNOSIS — R11.2 NAUSEA AND VOMITING, UNSPECIFIED VOMITING TYPE: ICD-10-CM

## 2025-08-20 LAB
ALBUMIN SERPL-MCNC: 4.1 G/DL (ref 3.5–5.2)
ALP SERPL-CCNC: 74 U/L (ref 35–104)
ALT SERPL-CCNC: 11 U/L (ref 0–35)
ANION GAP SERPL CALCULATED.3IONS-SCNC: 10 MMOL/L (ref 7–16)
AST SERPL-CCNC: 16 U/L (ref 0–35)
BACTERIA URNS QL MICRO: ABNORMAL
BASOPHILS # BLD: 0.03 K/UL (ref 0–0.2)
BASOPHILS NFR BLD: 1 % (ref 0–2)
BILIRUB SERPL-MCNC: 0.3 MG/DL (ref 0–1.2)
BILIRUB UR QL STRIP: NEGATIVE
BUN SERPL-MCNC: 7 MG/DL (ref 6–20)
CALCIUM SERPL-MCNC: 9.1 MG/DL (ref 8.6–10)
CHLORIDE SERPL-SCNC: 105 MMOL/L (ref 98–107)
CLARITY UR: CLEAR
CO2 SERPL-SCNC: 23 MMOL/L (ref 22–29)
COLOR UR: YELLOW
CREAT SERPL-MCNC: 0.8 MG/DL (ref 0.5–1)
EOSINOPHIL # BLD: 0.11 K/UL (ref 0.05–0.5)
EOSINOPHILS RELATIVE PERCENT: 2 % (ref 0–6)
EPI CELLS #/AREA URNS HPF: ABNORMAL /HPF
ERYTHROCYTE [DISTWIDTH] IN BLOOD BY AUTOMATED COUNT: 18.1 % (ref 11.5–15)
GFR, ESTIMATED: >90 ML/MIN/1.73M2
GLUCOSE SERPL-MCNC: 83 MG/DL (ref 74–99)
GLUCOSE UR STRIP-MCNC: NEGATIVE MG/DL
HCG, URINE, POC: NEGATIVE
HCT VFR BLD AUTO: 31 % (ref 34–48)
HGB BLD-MCNC: 9.5 G/DL (ref 11.5–15.5)
HGB UR QL STRIP.AUTO: NEGATIVE
IMM GRANULOCYTES # BLD AUTO: <0.03 K/UL (ref 0–0.58)
IMM GRANULOCYTES NFR BLD: 0 % (ref 0–5)
KETONES UR STRIP-MCNC: NEGATIVE MG/DL
LACTATE BLDV-SCNC: 1.4 MMOL/L (ref 0.5–2.2)
LEUKOCYTE ESTERASE UR QL STRIP: ABNORMAL
LIPASE SERPL-CCNC: 20 U/L (ref 13–60)
LYMPHOCYTES NFR BLD: 3.25 K/UL (ref 1.5–4)
LYMPHOCYTES RELATIVE PERCENT: 54 % (ref 20–42)
Lab: NORMAL
MCH RBC QN AUTO: 21.7 PG (ref 26–35)
MCHC RBC AUTO-ENTMCNC: 30.6 G/DL (ref 32–34.5)
MCV RBC AUTO: 70.9 FL (ref 80–99.9)
MONOCYTES NFR BLD: 0.4 K/UL (ref 0.1–0.95)
MONOCYTES NFR BLD: 7 % (ref 2–12)
NEGATIVE QC PASS/FAIL: NORMAL
NEUTROPHILS NFR BLD: 36 % (ref 43–80)
NEUTS SEG NFR BLD: 2.17 K/UL (ref 1.8–7.3)
NITRITE UR QL STRIP: NEGATIVE
PH UR STRIP: 6.5 [PH] (ref 5–8)
PLATELET # BLD AUTO: 260 K/UL (ref 130–450)
PMV BLD AUTO: 10.9 FL (ref 7–12)
POSITIVE QC PASS/FAIL: NORMAL
POTASSIUM SERPL-SCNC: 3.6 MMOL/L (ref 3.5–5.1)
PROT SERPL-MCNC: 7.4 G/DL (ref 6.4–8.3)
PROT UR STRIP-MCNC: NEGATIVE MG/DL
RBC # BLD AUTO: 4.37 M/UL (ref 3.5–5.5)
RBC #/AREA URNS HPF: ABNORMAL /HPF
SODIUM SERPL-SCNC: 138 MMOL/L (ref 136–145)
SP GR UR STRIP: 1.01 (ref 1–1.03)
UROBILINOGEN UR STRIP-ACNC: 0.2 EU/DL (ref 0–1)
WBC #/AREA URNS HPF: ABNORMAL /HPF
WBC OTHER # BLD: 6 K/UL (ref 4.5–11.5)

## 2025-08-20 PROCEDURE — 2580000003 HC RX 258: Performed by: STUDENT IN AN ORGANIZED HEALTH CARE EDUCATION/TRAINING PROGRAM

## 2025-08-20 PROCEDURE — 80053 COMPREHEN METABOLIC PANEL: CPT

## 2025-08-20 PROCEDURE — 74177 CT ABD & PELVIS W/CONTRAST: CPT

## 2025-08-20 PROCEDURE — 99285 EMERGENCY DEPT VISIT HI MDM: CPT

## 2025-08-20 PROCEDURE — 85025 COMPLETE CBC W/AUTO DIFF WBC: CPT

## 2025-08-20 PROCEDURE — 6360000002 HC RX W HCPCS: Performed by: STUDENT IN AN ORGANIZED HEALTH CARE EDUCATION/TRAINING PROGRAM

## 2025-08-20 PROCEDURE — 87086 URINE CULTURE/COLONY COUNT: CPT

## 2025-08-20 PROCEDURE — 6360000004 HC RX CONTRAST MEDICATION: Performed by: RADIOLOGY

## 2025-08-20 PROCEDURE — 96375 TX/PRO/DX INJ NEW DRUG ADDON: CPT

## 2025-08-20 PROCEDURE — 83605 ASSAY OF LACTIC ACID: CPT

## 2025-08-20 PROCEDURE — 96374 THER/PROPH/DIAG INJ IV PUSH: CPT

## 2025-08-20 PROCEDURE — 83690 ASSAY OF LIPASE: CPT

## 2025-08-20 PROCEDURE — 81001 URINALYSIS AUTO W/SCOPE: CPT

## 2025-08-20 RX ORDER — IOPAMIDOL 755 MG/ML
75 INJECTION, SOLUTION INTRAVASCULAR
Status: COMPLETED | OUTPATIENT
Start: 2025-08-20 | End: 2025-08-20

## 2025-08-20 RX ORDER — ONDANSETRON 2 MG/ML
4 INJECTION INTRAMUSCULAR; INTRAVENOUS ONCE
Status: COMPLETED | OUTPATIENT
Start: 2025-08-20 | End: 2025-08-20

## 2025-08-20 RX ORDER — MORPHINE SULFATE 2 MG/ML
2 INJECTION, SOLUTION INTRAMUSCULAR; INTRAVENOUS ONCE
Refills: 0 | Status: COMPLETED | OUTPATIENT
Start: 2025-08-20 | End: 2025-08-20

## 2025-08-20 RX ORDER — 0.9 % SODIUM CHLORIDE 0.9 %
1000 INTRAVENOUS SOLUTION INTRAVENOUS ONCE
Status: COMPLETED | OUTPATIENT
Start: 2025-08-20 | End: 2025-08-20

## 2025-08-20 RX ADMIN — IOPAMIDOL 75 ML: 755 INJECTION, SOLUTION INTRAVENOUS at 06:29

## 2025-08-20 RX ADMIN — ONDANSETRON 4 MG: 2 INJECTION, SOLUTION INTRAMUSCULAR; INTRAVENOUS at 05:30

## 2025-08-20 RX ADMIN — SODIUM CHLORIDE 1000 ML: 0.9 INJECTION, SOLUTION INTRAVENOUS at 05:30

## 2025-08-20 RX ADMIN — MORPHINE SULFATE 2 MG: 2 INJECTION, SOLUTION INTRAMUSCULAR; INTRAVENOUS at 05:30

## 2025-08-20 ASSESSMENT — ENCOUNTER SYMPTOMS
ABDOMINAL PAIN: 1
NAUSEA: 1
VOMITING: 1
COUGH: 0
BACK PAIN: 0
COLOR CHANGE: 0
DIARRHEA: 1
SHORTNESS OF BREATH: 0

## 2025-08-20 ASSESSMENT — PAIN DESCRIPTION - ORIENTATION: ORIENTATION: MID

## 2025-08-20 ASSESSMENT — LIFESTYLE VARIABLES: HOW OFTEN DO YOU HAVE A DRINK CONTAINING ALCOHOL: NEVER

## 2025-08-20 ASSESSMENT — PAIN DESCRIPTION - LOCATION: LOCATION: ABDOMEN

## 2025-08-20 ASSESSMENT — PAIN SCALES - GENERAL: PAINLEVEL_OUTOF10: 9

## 2025-08-20 ASSESSMENT — PAIN DESCRIPTION - PAIN TYPE: TYPE: ACUTE PAIN

## 2025-08-20 ASSESSMENT — PAIN - FUNCTIONAL ASSESSMENT: PAIN_FUNCTIONAL_ASSESSMENT: 0-10

## 2025-08-21 LAB
MICROORGANISM SPEC CULT: ABNORMAL
MICROORGANISM SPEC CULT: ABNORMAL
SPECIMEN DESCRIPTION: ABNORMAL

## (undated) DEVICE — CESAREAN BIRTH PACK: Brand: MEDLINE INDUSTRIES, INC.

## (undated) DEVICE — GLOVE SURG SZ 65 THK91MIL LTX FREE SYN POLYISOPRENE

## (undated) DEVICE — SLEEVE COMPRESS STD CALF KNEE SCD

## (undated) DEVICE — TRAY,VAG PREP,2PR VNYL GLV,4 C: Brand: MEDLINE INDUSTRIES, INC.

## (undated) DEVICE — DRAPE OBSTETRIC W40XL44IN NONFENESTRATED NONREINFORCED W FLD

## (undated) DEVICE — PVC URETHRAL CATHETER: Brand: DOVER

## (undated) DEVICE — SUTURE STRATAFIX SYMMETRIC SZ 1 L18IN ABSRB VLT CT1 L36CM SXPP1A404

## (undated) DEVICE — STRAP RESTRN W3.5XL18IN STD ALL PURP DISP W/ SLNG RNG

## (undated) DEVICE — RETRACTOR SURG M-L RNG DIA17CM INCIS 15CM ELAS SELF RET BLNT

## (undated) DEVICE — DRESSING SUPERABSORBENT W4XL4IN 4 LAYR NONWOVEN FLD

## (undated) DEVICE — SCALPEL SURG NO10 S STL ABS PLAS HNDL DISPOSABLE

## (undated) DEVICE — STANDARD HYPODERMIC NEEDLE,POLYPROPYLENE HUB: Brand: MONOJECT

## (undated) DEVICE — 1.5L THIN WALL CAN: Brand: CRD

## (undated) DEVICE — ELECTRODE PT RET AD L9FT HI MOIST COND ADH HYDRGEL CORDED

## (undated) DEVICE — MASTISOL ADHESIVE LIQ 2/3ML

## (undated) DEVICE — BLADE CLIPPER GEN PURP NS

## (undated) DEVICE — PAD MATERNITY CURITY ADH STRIP DISP

## (undated) DEVICE — GAUZE,SPONGE,4"X4",16PLY,XRAY,STRL,LF: Brand: MEDLINE

## (undated) DEVICE — PACK PROC 3IN1 W/ L12FT DIA0.25IN REINF SUCT TBNG W50XL901IN

## (undated) DEVICE — PROTECTOR ULN NRV FOAM DISPOSABLE

## (undated) DEVICE — GLOVE ORANGE PI 7   MSG9070

## (undated) DEVICE — TRAY CATH CATH OD16FR 200ML URIN M SIL CNTR ENTRY F

## (undated) DEVICE — SUTURE MCRYL SZ 0 L27IN ABSRB VLT CT-1 L36MM 1/2 CIR TAPR Y340H

## (undated) DEVICE — CHLORAPREP 26ML ORANGE

## (undated) DEVICE — TUBE BLD COLLECT ST 1 SIL COAT 7ML 10ML

## (undated) DEVICE — GOWN,SIRUS,POLYRNF,BRTHSLV,XLN/XXL,18/CS: Brand: MEDLINE

## (undated) DEVICE — LINER,SOFT,SUCTION CANISTER,1500CC: Brand: MEDLINE

## (undated) DEVICE — PAD ABD CURITY TENDERSORB 5X9IN

## (undated) DEVICE — MEDI-VAC YANKAUER SUCTION HANDLE W/BULBOUS TIP: Brand: CARDINAL HEALTH

## (undated) DEVICE — SCALPEL SURG NO21 S STL STR W/ INTEGR MTRC RUL DISP

## (undated) DEVICE — TRAY PROCED DILATATION CURETTAGE

## (undated) DEVICE — DRAPE, LAVH, STERILE: Brand: MEDLINE

## (undated) DEVICE — TOWEL,OR,DSP,ST,BLUE,STD,6/PK,12PK/CS: Brand: MEDLINE

## (undated) DEVICE — STAPLER SKIN SQ 30 ABSRB STPL DISP INSORB

## (undated) DEVICE — 3M™ STERI-STRIP™ ELASTIC SKIN CLOSURES, E4547, 1/2 IN X 4 IN (12 MM X 100 MM), 6 STRIPS/ENVELOPE: Brand: 3M™ STERI-STRIP™

## (undated) DEVICE — PACK,UNIVERSAL II,SIRUS: Brand: MEDLINE

## (undated) DEVICE — TELFA ADHESIVE ISLAND DRESSING: Brand: TELFA

## (undated) DEVICE — GLOVE ORANGE PI 7 1/2   MSG9075

## (undated) DEVICE — SUTURE VCRL SZ 1 L27IN ABSRB VLT L36MM CT-1 1/2 CIR J341H

## (undated) DEVICE — GOWN,SIRUS,FABRNF,XL,20/CS: Brand: MEDLINE